# Patient Record
Sex: MALE | Race: WHITE | NOT HISPANIC OR LATINO | Employment: UNEMPLOYED | URBAN - METROPOLITAN AREA
[De-identification: names, ages, dates, MRNs, and addresses within clinical notes are randomized per-mention and may not be internally consistent; named-entity substitution may affect disease eponyms.]

---

## 2017-04-28 ENCOUNTER — OFFICE VISIT (OUTPATIENT)
Dept: MEDICAL GROUP | Facility: PHYSICIAN GROUP | Age: 47
End: 2017-04-28
Payer: COMMERCIAL

## 2017-04-28 VITALS
DIASTOLIC BLOOD PRESSURE: 102 MMHG | OXYGEN SATURATION: 97 % | HEART RATE: 93 BPM | SYSTOLIC BLOOD PRESSURE: 152 MMHG | BODY MASS INDEX: 23.48 KG/M2 | HEIGHT: 70 IN | WEIGHT: 164 LBS | TEMPERATURE: 98.1 F | RESPIRATION RATE: 16 BRPM

## 2017-04-28 DIAGNOSIS — F17.200 TOBACCO DEPENDENCE: ICD-10-CM

## 2017-04-28 DIAGNOSIS — F41.9 ANXIETY: ICD-10-CM

## 2017-04-28 DIAGNOSIS — F10.10 ALCOHOL ABUSE, DAILY USE: ICD-10-CM

## 2017-04-28 DIAGNOSIS — I10 ESSENTIAL HYPERTENSION: ICD-10-CM

## 2017-04-28 PROCEDURE — 99214 OFFICE O/P EST MOD 30 MIN: CPT | Performed by: NURSE PRACTITIONER

## 2017-04-28 RX ORDER — LORAZEPAM 0.5 MG/1
0.5 TABLET ORAL DAILY
Qty: 30 TAB | Refills: 2 | Status: SHIPPED | OUTPATIENT
Start: 2017-04-28 | End: 2017-07-27 | Stop reason: SDUPTHER

## 2017-04-28 RX ORDER — ENALAPRIL MALEATE 5 MG/1
5 TABLET ORAL DAILY
Qty: 90 TAB | Refills: 1 | Status: SHIPPED | OUTPATIENT
Start: 2017-04-28 | End: 2017-09-30

## 2017-04-28 RX ORDER — ENALAPRIL MALEATE 5 MG/1
5 TABLET ORAL DAILY
COMMUNITY
End: 2017-09-30

## 2017-04-28 RX ORDER — LORAZEPAM 0.5 MG/1
0.5 TABLET ORAL DAILY
COMMUNITY
End: 2017-04-28 | Stop reason: SDUPTHER

## 2017-04-28 ASSESSMENT — PATIENT HEALTH QUESTIONNAIRE - PHQ9: CLINICAL INTERPRETATION OF PHQ2 SCORE: 0

## 2017-04-28 NOTE — MR AVS SNAPSHOT
"        Chiki King   2017 8:20 AM   Office Visit   MRN: [id number]    Department:  [facility]   Dept Phone:  [phone]    Description:  Male : 1970   Provider:  NIK Rivera           Reason for Visit     Establish Care           Allergies as of 2017     No Known Allergies      You were diagnosed with     Anxiety   [238481]       Essential hypertension   [9182998]       Alcohol abuse, daily use   [585364]       Tobacco dependence   [676983]         Vital Signs     Blood Pressure Pulse Temperature Respirations Height Weight    152/102 mmHg 93 36.7 °C (98.1 °F) 16 1.778 m (5' 10\") 74.39 kg (164 lb)    Body Mass Index Oxygen Saturation Smoking Status             23.53 kg/m2 97% Current Every Day Smoker         Basic Information     Date Of Birth Sex Race Ethnicity Preferred Language    1970 Male White Non- English      Problem List              ICD-10-CM Priority Class Noted - Resolved    Anxiety F41.9   2017 - Present    Essential hypertension I10   2017 - Present    Alcohol abuse, daily use F10.10   2017 - Present    Tobacco dependence F17.200   2017 - Present      Health Maintenance        Date Due Completion Dates    IMM DTaP/Tdap/Td Vaccine (1 - Tdap) 1988 ---            Current Immunizations     No immunizations on file.      Below and/or attached are the medications your provider expects you to take. Review all of your home medications and newly ordered medications with your provider and/or pharmacist. Follow medication instructions as directed by your provider and/or pharmacist. Please keep your medication list with you and share with your provider. Update the information when medications are discontinued, doses are changed, or new medications (including over-the-counter products) are added; and carry medication information at all times in the event of emergency situations     Allergies:  No Known Allergies          "   Medications  Valid as of: April 28, 2017 - 10:12 AM    Generic Name Brand Name Tablet Size Instructions for use    Enalapril Maleate (Tab) VASOTEC 5 MG Take 5 mg by mouth every day.        Enalapril Maleate (Tab) VASOTEC 5 MG Take 1 Tab by mouth every day.        LORazepam (Tab) ATIVAN 0.5 MG Take 1 Tab by mouth every day.        .                 Medicines prescribed today were sent to:     [facility] [id number] [place] [address], [place] - [address] [facility] AT [address] [place] [address]    Phone: [phone] Fax: [phone]    Open 24 Hours?: No      Medication refill instructions:       If your prescription bottle indicates you have medication refills left, it is not necessary to call your provider’s office. Please contact your pharmacy and they will refill your medication.    If your prescription bottle indicates you do not have any refills left, you may request refills at any time through one of the following ways: The online [employer] system (except Urgent Care), by calling your provider’s office, or by asking your pharmacy to contact your provider’s office with a refill request. Medication refills are processed only during regular business hours and may not be available until the next business day. Your provider may request additional information or to have a follow-up visit with you prior to refilling your medication.   *Please Note: Medication refills are assigned a new Rx number when refilled electronically. Your pharmacy may indicate that no refills were authorized even though a new prescription for the same medication is available at the pharmacy. Please request the medicine by name with the pharmacy before contacting your provider for a refill.        Instructions    Managing Your High Blood Pressure  Blood pressure is a measurement of how forceful your blood is pressing against the walls of the arteries. Arteries are muscular tubes within the circulatory system.  Blood pressure does not stay the same. Blood pressure rises when you are active, excited, or nervous; and it lowers during sleep and relaxation. If the numbers measuring your blood pressure stay above normal most of the time, you are at risk for health problems. High blood pressure (hypertension) is a long-term (chronic) condition in which blood pressure is elevated.  A blood pressure reading is recorded as two numbers, such as 120 over 80 (or 120/80). The first, higher number is called the systolic pressure. It is a measure of the pressure in your arteries as the heart beats. The second, lower number is called the diastolic pressure. It is a measure of the pressure in your arteries as the heart relaxes between beats.   Keeping your blood pressure in a normal range is important to your overall health and prevention of health problems, such as heart disease and stroke. When your blood pressure is uncontrolled, your heart has to work harder than normal. High blood pressure is a very common condition in adults because blood pressure tends to rise with age. Men and women are equally likely to have hypertension but at different times in life. Before age 45, men are more likely to have hypertension. After 65 years of age, women are more likely to have it. Hypertension is especially common in  Americans. This condition often has no signs or symptoms. The cause of the condition is usually not known. Your caregiver can help you come up with a plan to keep your blood pressure in a normal, healthy range.  BLOOD PRESSURE STAGES  Blood pressure is classified into four stages: normal, prehypertension, stage 1, and stage 2. Your blood pressure reading will be used to determine what type of treatment, if any, is necessary. Appropriate treatment options are tied to these four stages:   Normal  · Systolic pressure (mm Hg): below 120.  · Diastolic pressure (mm Hg): below 80.  Prehypertension  · Systolic pressure (mm Hg): 120 to  139.  · Diastolic pressure (mm Hg): 80 to 89.  Stage 1  · Systolic pressure (mm Hg): 140 to 159.  · Diastolic pressure (mm Hg): 90 to 99.  Stage 2  · Systolic pressure (mm Hg): 160 or above.  · Diastolic pressure (mm Hg): 100 or above.  RISKS RELATED TO HIGH BLOOD PRESSURE  Managing your blood pressure is an important responsibility. Uncontrolled high blood pressure can lead to:  · A heart attack.  · A stroke.  · A weakened blood vessel (aneurysm).  · Heart failure.  · Kidney damage.  · Eye damage.  · Metabolic syndrome.  · Memory and concentration problems.  HOW TO MANAGE YOUR BLOOD PRESSURE  Blood pressure can be managed effectively with lifestyle changes and medicines (if needed). Your caregiver will help you come up with a plan to bring your blood pressure within a normal range. Your plan should include the following:  Education  · Read all information provided by your caregivers about how to control blood pressure.  · Educate yourself on the latest guidelines and treatment recommendations. New research is always being done to further define the risks and treatments for high blood pressure.  Lifestyle changes  · Control your weight.  · Avoid smoking.  · Stay physically active.  · Reduce the amount of salt in your diet.  · Reduce stress.  · Control any chronic conditions, such as high cholesterol or diabetes.  · Reduce your alcohol intake.  Medicines  · Several medicines (antihypertensive medicines) are available, if needed, to bring blood pressure within a normal range.  Communication  · Review all the medicines you take with your caregiver because there may be side effects or interactions.  · Talk with your caregiver about your diet, exercise habits, and other lifestyle factors that may be contributing to high blood pressure.  · See your caregiver regularly. Your caregiver can help you create and adjust your plan for managing high blood pressure.  RECOMMENDATIONS FOR TREATMENT AND FOLLOW-UP   The following  recommendations are based on current guidelines for managing high blood pressure in nonpregnant adults. Use these recommendations to identify the proper follow-up period or treatment option based on your blood pressure reading. You can discuss these options with your caregiver.  · Systolic pressure of 120 to 139 or diastolic pressure of 80 to 89: Follow up with your caregiver as directed.  · Systolic pressure of 140 to 160 or diastolic pressure of 90 to 100: Follow up with your caregiver within 2 months.  · Systolic pressure above 160 or diastolic pressure above 100: Follow up with your caregiver within 1 month.  · Systolic pressure above 180 or diastolic pressure above 110: Consider antihypertensive therapy; follow up with your caregiver within 1 week.  · Systolic pressure above 200 or diastolic pressure above 120: Begin antihypertensive therapy; follow up with your caregiver within 1 week.     This information is not intended to replace advice given to you by your health care provider. Make sure you discuss any questions you have with your health care provider.     Document Released: 09/11/2013 Document Reviewed: 09/11/2013  [employer] Interactive Patient Education ©2016 Elsevier Inc.              food.de Access Code: [email or web address]  Expires: 5/10/2017  1:04 PM    food.de  A secure, online tool to manage your health information     [employer]’s food.de® is a secure, online tool that connects you to your personalized health information from the privacy of your home -- day or night - making it very easy for you to manage your healthcare. Once the activation process is completed, you can even access your medical information using the food.de kathy, which is available for free in the Apple Kathy store or Google Play store.     food.de provides the following levels of access (as shown below):   My Chart Features   [facility] Doctor [facility]  Specialists [facility]  Urgent  Care Non-[facility]  Primary Care  Doctor    Email your healthcare team securely and privately 24/7 X X X    Manage appointments: schedule your next appointment; view details of past/upcoming appointments X      Request prescription refills. X      View recent personal medical records, including lab and immunizations X X X X   View health record, including health history, allergies, medications X X X X   Read reports about your outpatient visits, procedures, consult and ER notes X X X X   See your discharge summary, which is a recap of your hospital and/or ER visit that includes your diagnosis, lab results, and care plan. X X       How to register for [employer]:  1. Go to  [email or web address].  2. Click on the Sign Up Now box, which takes you to the New Member Sign Up page. You will need to provide the following information:  a. Enter your [employer] Access Code exactly as it appears at the top of this page. (You will not need to use this code after you’ve completed the sign-up process. If you do not sign up before the expiration date, you must request a new code.)   b. Enter your date of birth.   c. Enter your home email address.   d. Click Submit, and follow the next screen’s instructions.  3. Create a [employer] ID. This will be your [employer] login ID and cannot be changed, so think of one that is secure and easy to remember.  4. Create a [employer] password. You can change your password at any time.  5. Enter your Password Reset Question and Answer. This can be used at a later time if you forget your password.   6. Enter your e-mail address. This allows you to receive e-mail notifications when new information is available in [employer].  7. Click Sign Up. You can now view your health information.    For assistance activating your [employer] account, call [phone]        Quit Tobacco Information     Do you want to quit using tobacco?    Quitting tobacco decreases risks of cancer, heart and lung disease, increases life expectancy, improves sense of taste and  smell, and increases spending money, among other benefits.    If you are thinking about quitting, we can help.  • [facility] Quit Tobacco Program: 620.127.5082  o Program occurs weekly for four weeks and includes pharmacist consultation on products to support quitting smoking or chewing tobacco. A provider referral is needed for pharmacist consultation.  • Tobacco Users Help Hotline: 5-800-QUIT-NOW (395-6654) or https://[place].quitlogix.org/  o Free, confidential telephone and online coaching for [place] residents. Sessions are designed on a schedule that is convenient for you. Eligible clients receive free nicotine replacement therapy.  • Nationally: www.smokefree.gov  o Information and professional assistance to support both immediate and long-term needs as you become, and remain, a non-smoker. Smokefree.gov allows you to choose the help that best fits your needs.

## 2017-04-28 NOTE — ASSESSMENT & PLAN NOTE
Chronic condition: Patient has been treated for hypertension for 8 years. He is currently not taking any medication. Was previously on enalapril 5 mg daily. He stopped medication 18 months ago after working out in the gym and lowering the blood pressure. Blood pressure today is 152/102 and on a previous visit December 30, 2016 was 156/92. Discussed restarting medication.  He denies any chest pain, diaphoresis, shortness of breath, headaches, dizziness or blurred vision. Mother and sister also  Have high blood pressure and father has heart disease and needs bypass surgery.

## 2017-04-28 NOTE — ASSESSMENT & PLAN NOTE
Chronic condition: Patient had anxiety for 1.5 years and currently takes lorazepam on an as-needed basis. 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

## 2017-04-28 NOTE — ASSESSMENT & PLAN NOTE
Patient is a current smoker and has been smoking at least a half a pack a day for 20 years. He had 2 years that he quit smoking using the vape cigarette. He is interested in quitting and we discussed Wellbutrin and Chantix. He is not ready to quit at this time due to his stress level with his father's health concerns. He will contact me when he is ready to prescribe Wellbutrin

## 2017-04-28 NOTE — Clinical Note
[facility]  NIK Rivera  [address] [facility] [address]  [place] [address]  Fax: [phone]   Authorization for Release/Disclosure of   Protected Health Information   Name: LUIS MIGUEL SHEA : 1970 SSN: [id number]   Address: [address] [place], [place] [address]   [place] [address] Phone:    [phone] (home)    I authorize the entity listed below to release/disclose the PHI below to:   [facility]/NIK Rivera and NIK Rivera   Provider or Entity Name: [facility]   Dr Mistry      [facility]   Phone:      Fax:[phone]     Reason for request: continuity of care   Information to be released:    [  ] LAST COLONOSCOPY,  including any PATH REPORT and follow-up  [  ] LAST FIT/COLOGUARD RESULT [  ] LAST DEXA  [  ] LAST MAMMOGRAM  [  ] LAST PAP  [  ] LAST LABS [  ] RETINA EXAM REPORT  [  ] IMMUNIZATION RECORDS  [*] Release all info      [  ] Check here and initial the line next to each item to release ALL health information INCLUDING  _____ Care and treatment for drug and / or alcohol abuse  _____ HIV testing, infection status, or AIDS  _____ Genetic Testing    DATES OF SERVICE OR TIME PERIOD TO BE DISCLOSED: ___All Records____  I understand and acknowledge that:  * This Authorization may be revoked at any time by you in writing, except if your health information has already been used or disclosed.  * Your health information that will be used or disclosed as a result of you signing this authorization could be re-disclosed by the recipient. If this occurs, your re-disclosed health information may no longer be protected by State or Federal laws.  * You may refuse to sign this Authorization. Your refusal will not affect your ability to obtain treatment.  * This Authorization becomes effective upon signing and will  on (date) __________.      If no date is indicated, this Authorization will  one (1) year from the signature date.       Name: Chiki King    Signature:   Date:     4/28/2017       PLEASE FAX REQUESTED RECORDS BACK TO: [phone]

## 2017-04-28 NOTE — ASSESSMENT & PLAN NOTE
Patient admits to alcohol use drinking approximately 4 drinks daily 7 days a week. He states that sometimes it less depending how long it takes him to wind down from work. His father is ill at this time and causing him a lot of stress, but baking him realize he has to take care of his own health. He also had a paternal grandfather who  at a young age from alcoholic cirrhosis. He plans to start decreasing his alcohol intake

## 2017-04-28 NOTE — PROGRESS NOTES
Chief Complaint   Patient presents with   • Establish Care       HPI:    Chiki King is a 46 y.o. male here to establish care and to discuss the following:    Anxiety  Chronic condition: Patient had anxiety for 1.5 years and currently takes lorazepam on an as-needed basis. 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    Essential hypertension  Chronic condition: Patient has been treated for hypertension for 8 years. He is currently not taking any medication. Was previously on enalapril 5 mg daily. He stopped medication 18 months ago after working out in the gym and lowering the blood pressure. Blood pressure today is 152/102 and on a previous visit 2016 was 156/92. Discussed restarting medication.  He denies any chest pain, diaphoresis, shortness of breath, headaches, dizziness or blurred vision. Mother and sister also  Have high blood pressure and father has heart disease and needs bypass surgery.    Alcohol abuse, daily use  Patient admits to alcohol use drinking approximately 4 drinks daily 7 days a week. He states that sometimes it less depending how long it takes him to wind down from work. His father is ill at this time and causing him a lot of stress, but baking him realize he has to take care of his own health. He also had a paternal grandfather who  at a young age from alcoholic cirrhosis. He plans to start decreasing his alcohol intake    Tobacco dependence  Patient is a current smoker and has been smoking at least a half a pack a day for 20 years. He had 2 years that he quit smoking using the vape cigarette. He is interested in quitting and we discussed Wellbutrin and Chantix. He is not ready to quit at this time due to his stress level with his father's health concerns. He will contact me when he is ready to prescribe Wellbutrin        Current medicines (including changes today)  Current Outpatient Prescriptions   Medication  Sig Dispense Refill   • enalapril (VASOTEC) 5 MG Tab Take 5 mg by mouth every day.     • enalapril (VASOTEC) 5 MG Tab Take 1 Tab by mouth every day. 90 Tab 1   • lorazepam (ATIVAN) 0.5 MG Tab Take 1 Tab by mouth every day. 30 Tab 2     No current facility-administered medications for this visit.     He  has a past medical history of Essential hypertension; Anxiety; and Trigeminal neuralgia.  He  has past surgical history that includes rhinoplasty.  Social History   Substance Use Topics   • Smoking status: Current Every Day Smoker -- 0.50 packs/day for 20 years   • Smokeless tobacco: Never Used   • Alcohol Use: 16.8 oz/week     28 Standard drinks or equivalent per week     Social History     Social History Narrative     Family History   Problem Relation Age of Onset   • Hypertension Mother    • Heart Disease Father    • Hypertension Father    • Cancer Father      leukemia   • Hypertension Sister    • Thyroid Sister    • Cancer Maternal Grandfather      bone   • Diabetes Paternal Grandmother    • Alcohol/Drug Paternal Grandfather      Family Status   Relation Status Death Age   • Mother Alive    • Father Alive    • Sister Alive    • Maternal Grandmother Alive    • Maternal Grandfather     • Paternal Grandmother     • Paternal Grandfather       alcoholic cirrhosis         ROS    Review of Systems   Constitutional: Negative for fever, chills, weight loss and malaise/fatigue.   HENT: Negative for ear pain, nosebleeds, congestion, sore throat and neck pain.    Eyes: Negative for blurred vision.   Respiratory: Negative for cough, sputum production, shortness of breath and wheezing.    Cardiovascular: Negative for chest pain, palpitations,  and leg swelling.   Gastrointestinal: Negative for heartburn, nausea, vomiting, diarrhea and abdominal pain.   Genitourinary: Negative for dysuria, urgency and frequency.   Musculoskeletal: Negative for myalgias, back pain and joint pain.   Skin: Negative for rash  "and itching.   Neurological: Negative for dizziness, tingling, tremors, sensory change, focal weakness and headaches.   Endo/Heme/Allergies: Does not bruise/bleed easily.   Psychiatric/Behavioral: Negative for depression,  suicidal ideas, insomnia and memory loss.  Positive for anxiety    All other systems reviewed and are negative except as in HPI.    Immunizations: Cannot get a flu vaccine this year, but had one last year.               Objective:     Blood pressure 152/102, pulse 93, temperature 36.7 °C (98.1 °F), resp. rate 16, height 1.778 m (5' 10\"), weight 74.39 kg (164 lb), SpO2 97 %. Body mass index is 23.53 kg/(m^2).  Physical Exam:  Constitutional: Alert, no distress.  Skin: Warm, dry, good turgor, no rashes in visible areas.  Eye: Equal, round and reactive, conjunctiva clear, lids normal.  ENMT: Lips without lesions, good dentition, oropharynx clear. Ear canals are clear, TMs within normal limits bilaterally.   Neck: Trachea midline, no masses, no thyromegaly. No cervical or supraclavicular lymphadenopathy.  Respiratory: Unlabored respiratory effort, lungs clear to auscultation, no wheezes, no ronchi.  Cardiovascular: Normal S1, S2, no murmur, no edema.  Abdomen: Soft, non-tender, no masses, no hepatosplenomegaly.  Psych: Alert and oriented x3, normal affect and mood.  MS: Ambulates independently with steady gait. Has full range of motion of all extremities and spine.  Neuro: Cranial nerves intact. DTRs within normal limits. No neurological deficits.        Assessment and Plan:   The following treatment plan was discussed   1. Anxiety  lorazepam (ATIVAN) 0.5 MG Tab   2. Essential hypertension  enalapril (VASOTEC) 5 MG Tab   3. Alcohol abuse, daily use     4. Tobacco dependence       Followup: Return in about 3 months (around 7/28/2017) for anxiety, bp.   Please note that this dictation was created using voice recognition software. I have made every reasonable attempt to correct obvious errors, but I " expect that there are errors of grammar and possibly content that I did not discover before finalizing the note.

## 2017-04-28 NOTE — PATIENT INSTRUCTIONS
Managing Your High Blood Pressure  Blood pressure is a measurement of how forceful your blood is pressing against the walls of the arteries. Arteries are muscular tubes within the circulatory system. Blood pressure does not stay the same. Blood pressure rises when you are active, excited, or nervous; and it lowers during sleep and relaxation. If the numbers measuring your blood pressure stay above normal most of the time, you are at risk for health problems. High blood pressure (hypertension) is a long-term (chronic) condition in which blood pressure is elevated.  A blood pressure reading is recorded as two numbers, such as 120 over 80 (or 120/80). The first, higher number is called the systolic pressure. It is a measure of the pressure in your arteries as the heart beats. The second, lower number is called the diastolic pressure. It is a measure of the pressure in your arteries as the heart relaxes between beats.   Keeping your blood pressure in a normal range is important to your overall health and prevention of health problems, such as heart disease and stroke. When your blood pressure is uncontrolled, your heart has to work harder than normal. High blood pressure is a very common condition in adults because blood pressure tends to rise with age. Men and women are equally likely to have hypertension but at different times in life. Before age 45, men are more likely to have hypertension. After 65 years of age, women are more likely to have it. Hypertension is especially common in  Americans. This condition often has no signs or symptoms. The cause of the condition is usually not known. Your caregiver can help you come up with a plan to keep your blood pressure in a normal, healthy range.  BLOOD PRESSURE STAGES  Blood pressure is classified into four stages: normal, prehypertension, stage 1, and stage 2. Your blood pressure reading will be used to determine what type of treatment, if any, is necessary.  Appropriate treatment options are tied to these four stages:   Normal  · Systolic pressure (mm Hg): below 120.  · Diastolic pressure (mm Hg): below 80.  Prehypertension  · Systolic pressure (mm Hg): 120 to 139.  · Diastolic pressure (mm Hg): 80 to 89.  Stage 1  · Systolic pressure (mm Hg): 140 to 159.  · Diastolic pressure (mm Hg): 90 to 99.  Stage 2  · Systolic pressure (mm Hg): 160 or above.  · Diastolic pressure (mm Hg): 100 or above.  RISKS RELATED TO HIGH BLOOD PRESSURE  Managing your blood pressure is an important responsibility. Uncontrolled high blood pressure can lead to:  · A heart attack.  · A stroke.  · A weakened blood vessel (aneurysm).  · Heart failure.  · Kidney damage.  · Eye damage.  · Metabolic syndrome.  · Memory and concentration problems.  HOW TO MANAGE YOUR BLOOD PRESSURE  Blood pressure can be managed effectively with lifestyle changes and medicines (if needed). Your caregiver will help you come up with a plan to bring your blood pressure within a normal range. Your plan should include the following:  Education  · Read all information provided by your caregivers about how to control blood pressure.  · Educate yourself on the latest guidelines and treatment recommendations. New research is always being done to further define the risks and treatments for high blood pressure.  Lifestyle changes  · Control your weight.  · Avoid smoking.  · Stay physically active.  · Reduce the amount of salt in your diet.  · Reduce stress.  · Control any chronic conditions, such as high cholesterol or diabetes.  · Reduce your alcohol intake.  Medicines  · Several medicines (antihypertensive medicines) are available, if needed, to bring blood pressure within a normal range.  Communication  · Review all the medicines you take with your caregiver because there may be side effects or interactions.  · Talk with your caregiver about your diet, exercise habits, and other lifestyle factors that may be contributing to  high blood pressure.  · See your caregiver regularly. Your caregiver can help you create and adjust your plan for managing high blood pressure.  RECOMMENDATIONS FOR TREATMENT AND FOLLOW-UP   The following recommendations are based on current guidelines for managing high blood pressure in nonpregnant adults. Use these recommendations to identify the proper follow-up period or treatment option based on your blood pressure reading. You can discuss these options with your caregiver.  · Systolic pressure of 120 to 139 or diastolic pressure of 80 to 89: Follow up with your caregiver as directed.  · Systolic pressure of 140 to 160 or diastolic pressure of 90 to 100: Follow up with your caregiver within 2 months.  · Systolic pressure above 160 or diastolic pressure above 100: Follow up with your caregiver within 1 month.  · Systolic pressure above 180 or diastolic pressure above 110: Consider antihypertensive therapy; follow up with your caregiver within 1 week.  · Systolic pressure above 200 or diastolic pressure above 120: Begin antihypertensive therapy; follow up with your caregiver within 1 week.     This information is not intended to replace advice given to you by your health care provider. Make sure you discuss any questions you have with your health care provider.     Document Released: 09/11/2013 Document Reviewed: 09/11/2013  [employer] Interactive Patient Education ©2016 [employer]

## 2017-07-27 ENCOUNTER — OFFICE VISIT (OUTPATIENT)
Dept: MEDICAL GROUP | Facility: PHYSICIAN GROUP | Age: 47
End: 2017-07-27
Payer: COMMERCIAL

## 2017-07-27 VITALS
BODY MASS INDEX: 23.54 KG/M2 | TEMPERATURE: 97.7 F | HEIGHT: 70 IN | HEART RATE: 75 BPM | OXYGEN SATURATION: 97 % | WEIGHT: 164.4 LBS | RESPIRATION RATE: 16 BRPM | DIASTOLIC BLOOD PRESSURE: 100 MMHG | SYSTOLIC BLOOD PRESSURE: 120 MMHG

## 2017-07-27 DIAGNOSIS — F41.9 ANXIETY: ICD-10-CM

## 2017-07-27 DIAGNOSIS — I10 ESSENTIAL HYPERTENSION: ICD-10-CM

## 2017-07-27 PROCEDURE — 99214 OFFICE O/P EST MOD 30 MIN: CPT | Performed by: NURSE PRACTITIONER

## 2017-07-27 RX ORDER — LORAZEPAM 0.5 MG/1
0.5 TABLET ORAL DAILY
Qty: 30 TAB | Refills: 2 | Status: SHIPPED | OUTPATIENT
Start: 2017-07-27 | End: 2017-12-22 | Stop reason: SDUPTHER

## 2017-07-27 RX ORDER — ENALAPRIL MALEATE AND HYDROCHLOROTHIAZIDE 5; 12.5 MG/1; MG/1
1 TABLET ORAL
Qty: 30 TAB | Refills: 1 | Status: SHIPPED | OUTPATIENT
Start: 2017-07-27 | End: 2017-09-29 | Stop reason: SDUPTHER

## 2017-07-27 NOTE — ASSESSMENT & PLAN NOTE
Chronic condition: Patient is treated for hypertension with enalapril.  He denies any chest pain, diaphoresis, shortness of breath, headaches, dizziness or blurred vision. BP today is 120/100.

## 2017-07-27 NOTE — PROGRESS NOTES
Subjective:     Chief Complaint   Patient presents with   • Follow-Up     Medications        HPI:  Chiki King is a 47 y.o. male here today to discuss the following:    Essential hypertension  Chronic condition: Patient is treated for hypertension with enalapril.  He denies any chest pain, diaphoresis, shortness of breath, headaches, dizziness or blurred vision. BP today is 120/100.    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 He is requesting refills on medication. He denies any chest pain or tightness, shortness of breath or dyspnea, headaches or dizziness..           Current medicines (including changes today)  Current Outpatient Prescriptions   Medication Sig Dispense Refill   • ENALAPRIL MALEATE-HCTZ 5-12.5 MG Tab Take 1 tablet by mouth 1 time daily as needed. 30 Tab 1   • lorazepam (ATIVAN) 0.5 MG Tab Take 1 Tab by mouth every day. 30 Tab 2   • enalapril (VASOTEC) 5 MG Tab Take 5 mg by mouth every day.     • enalapril (VASOTEC) 5 MG Tab Take 1 Tab by mouth every day. 90 Tab 1     No current facility-administered medications for this visit.       He  has a past medical history of Essential hypertension; Anxiety; and Trigeminal neuralgia.    ROS   Review of Systems   Constitutional: Negative for fever, chills, weight loss and malaise/fatigue.   HENT: Negative for ear pain, nosebleeds, congestion, sore throat and neck pain.    Respiratory: Negative for cough, sputum production, shortness of breath and wheezing.    Cardiovascular: Negative for chest pain, palpitations,  and leg swelling.   Gastrointestinal: Negative for heartburn, nausea, vomiting, diarrhea and abdominal pain.   Neurological: Negative for dizziness, tingling, tremors, sensory change, focal weakness and headaches.   Psychiatric/Behavioral: Negative for depression, suicidal ideas, insomnia and  "memory loss.  Positive for anxiety  All other systems reviewed and are negative except as in HPI.     Objective:   Physical Exam:  Blood pressure 120/100, pulse 75, temperature 36.5 °C (97.7 °F), resp. rate 16, height 1.778 m (5' 10\"), weight 74.571 kg (164 lb 6.4 oz), SpO2 97 %. Body mass index is 23.59 kg/(m^2).   Physical Exam:  Alert, oriented in no acute distress.  Eye contact is good, speech goal directed, affect calm  HEENT: conjunctiva non-injected, sclera non-icteric.  Pinna normal.   Neck No adenopathy or masses in the neck or supraclavicular regions.  Lungs: clear to auscultation bilaterally with good excursion.  CV: regular rate and rhythm.   Abdomen: soft, nontender, No CVAT. Normal bowel sounds.  Ext: no edema, color normal, vascularity normal, temperature normal  MS: Normal gait and station    Assessment and Plan:   The following treatment plan was discussed   1. Essential hypertension  ENALAPRIL MALEATE-HCTZ 5-12.5 MG Tab   2. Anxiety  lorazepam (ATIVAN) 0.5 MG Tab    added HCTZ 12.5 mg daily to enalapril    Followup: Return in about 3 months (around 10/27/2017) for anxiety, hypertension.   Please note that this dictation was created using voice recognition software. I have made every reasonable attempt to correct obvious errors, but I expect that there are errors of grammar and possibly content that I did not discover before finalizing the note.             "

## 2017-08-07 NOTE — ASSESSMENT & PLAN NOTE
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 He is requesting refills on medication. He denies any chest pain or tightness, shortness of breath or dyspnea, headaches or dizziness..

## 2017-09-29 DIAGNOSIS — I10 ESSENTIAL HYPERTENSION: ICD-10-CM

## 2017-09-30 RX ORDER — ENALAPRIL MALEATE AND HYDROCHLOROTHIAZIDE 5; 12.5 MG/1; MG/1
TABLET ORAL
Qty: 90 TAB | Refills: 1 | Status: SHIPPED | OUTPATIENT
Start: 2017-09-30 | End: 2017-12-22 | Stop reason: SDUPTHER

## 2017-12-22 ENCOUNTER — OFFICE VISIT (OUTPATIENT)
Dept: MEDICAL GROUP | Facility: PHYSICIAN GROUP | Age: 47
End: 2017-12-22
Payer: COMMERCIAL

## 2017-12-22 VITALS
HEART RATE: 98 BPM | WEIGHT: 167 LBS | RESPIRATION RATE: 12 BRPM | TEMPERATURE: 98.8 F | SYSTOLIC BLOOD PRESSURE: 124 MMHG | DIASTOLIC BLOOD PRESSURE: 90 MMHG | BODY MASS INDEX: 23.96 KG/M2 | OXYGEN SATURATION: 95 %

## 2017-12-22 DIAGNOSIS — F10.10 ALCOHOL ABUSE, DAILY USE: ICD-10-CM

## 2017-12-22 DIAGNOSIS — Z13.6 SCREENING FOR CARDIOVASCULAR, RESPIRATORY, AND GENITOURINARY DISEASES: ICD-10-CM

## 2017-12-22 DIAGNOSIS — Z12.83 SKIN CANCER SCREENING: ICD-10-CM

## 2017-12-22 DIAGNOSIS — I10 ESSENTIAL HYPERTENSION: Chronic | ICD-10-CM

## 2017-12-22 DIAGNOSIS — F17.200 TOBACCO DEPENDENCE: Chronic | ICD-10-CM

## 2017-12-22 DIAGNOSIS — Z13.83 SCREENING FOR CARDIOVASCULAR, RESPIRATORY, AND GENITOURINARY DISEASES: ICD-10-CM

## 2017-12-22 DIAGNOSIS — F41.9 ANXIETY: Chronic | ICD-10-CM

## 2017-12-22 DIAGNOSIS — Z23 NEED FOR VACCINATION: ICD-10-CM

## 2017-12-22 DIAGNOSIS — B00.1 HERPES LABIALIS: ICD-10-CM

## 2017-12-22 DIAGNOSIS — Z13.89 SCREENING FOR CARDIOVASCULAR, RESPIRATORY, AND GENITOURINARY DISEASES: ICD-10-CM

## 2017-12-22 PROCEDURE — 90471 IMMUNIZATION ADMIN: CPT | Performed by: NURSE PRACTITIONER

## 2017-12-22 PROCEDURE — 90686 IIV4 VACC NO PRSV 0.5 ML IM: CPT | Performed by: NURSE PRACTITIONER

## 2017-12-22 PROCEDURE — 99214 OFFICE O/P EST MOD 30 MIN: CPT | Mod: 25 | Performed by: NURSE PRACTITIONER

## 2017-12-22 RX ORDER — LORAZEPAM 0.5 MG/1
0.5 TABLET ORAL DAILY
Qty: 30 TAB | Refills: 2 | Status: SHIPPED | OUTPATIENT
Start: 2017-12-22 | End: 2018-05-22 | Stop reason: SDUPTHER

## 2017-12-22 RX ORDER — ENALAPRIL MALEATE AND HYDROCHLOROTHIAZIDE 5; 12.5 MG/1; MG/1
1 TABLET ORAL
Qty: 90 TAB | Refills: 1 | Status: SHIPPED | OUTPATIENT
Start: 2017-12-22 | End: 2018-04-23 | Stop reason: SDUPTHER

## 2017-12-22 RX ORDER — VALACYCLOVIR HYDROCHLORIDE 500 MG/1
2000 TABLET, FILM COATED ORAL EVERY 12 HOURS PRN
Qty: 240 TAB | Refills: 1 | Status: SHIPPED | OUTPATIENT
Start: 2017-12-22 | End: 2020-09-04 | Stop reason: SDUPTHER

## 2017-12-22 RX ORDER — VALACYCLOVIR HYDROCHLORIDE 500 MG/1
500 TABLET, FILM COATED ORAL 2 TIMES DAILY
COMMUNITY
End: 2017-12-22 | Stop reason: SDUPTHER

## 2017-12-22 NOTE — ASSESSMENT & PLAN NOTE
Chronic condition: Patient's had anxiety for about 5 years. 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 We have discussed the dangers of alcohol with lorazepam, but he states that he takes the medication early in the day

## 2017-12-22 NOTE — ASSESSMENT & PLAN NOTE
Chronic condition: Patient treated for hypertension with enalapril. Blood pressure today is 140/90.. He denies any chest pain, diaphoresis, shortness of breath, headaches, dizziness or blurred vision.

## 2017-12-22 NOTE — ASSESSMENT & PLAN NOTE
Patient gets cold sores approximately once a month and takes acyclovir. He is requesting a refill on prescription.

## 2017-12-22 NOTE — ASSESSMENT & PLAN NOTE
Patient is a current smoker only smoking 3 cigarettes daily. He is here today to discuss smoking cessation options. He elected to try nicotine patches as he has already decreased significantly on his own.

## 2017-12-22 NOTE — ASSESSMENT & PLAN NOTE
Patient reports he is currently drinking 3 alcoholic drinks daily, which is a reduction from previous intake. He has a cold 2 stop drinking.

## 2017-12-22 NOTE — PROGRESS NOTES
Subjective:     Chief Complaint   Patient presents with   • Follow-Up     Fill scripts and wants to quit smoking       HPI:  Chiki King is a 47 y.o. male here today to discuss the following:    Alcohol abuse, daily use  Patient reports he is currently drinking 3 alcoholic drinks daily, which is a reduction from previous intake. He has a cold 2 stop drinking.    Anxiety  Chronic condition: Patient's had anxiety for about 5 years. 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 We have discussed the dangers of alcohol with lorazepam, but he states that he takes the medication early in the day    Essential hypertension  Chronic condition: Patient treated for hypertension with enalapril. Blood pressure today is 140/90.. He denies any chest pain, diaphoresis, shortness of breath, headaches, dizziness or blurred vision.    Herpes labialis  Patient gets cold sores approximately once a month and takes acyclovir. He is requesting a refill on prescription.    Skin cancer screening  Patient requesting referral to dermatology for full body skin cancer screening.    Tobacco dependence  Patient is a current smoker only smoking 3 cigarettes daily. He is here today to discuss smoking cessation options. He elected to try nicotine patches as he has already decreased significantly on his own.         Current medicines (including changes today)  Current Outpatient Prescriptions   Medication Sig Dispense Refill   • nicotine (NICODERM) 7 MG/24HR PATCH 24 HR Apply 1 Patch to skin as directed every 24 hours. 30 Patch 2   • valacyclovir (VALTREX) 500 MG Tab Take 4 Tabs by mouth every 12 hours as needed (for one day). 240 Tab 1   • lorazepam (ATIVAN) 0.5 MG Tab Take 1 Tab by mouth every day for 90 days. 30 Tab 2   • ENALAPRIL MALEATE-HCTZ 5-12.5 MG Tab Take 1 Tab by mouth 1 time daily as needed. 90 Tab 1     No current facility-administered medications for this visit.        He  has a past  medical history of Anxiety; Essential hypertension; and Trigeminal neuralgia.    ROS   Review of Systems   Constitutional: Negative for fever, chills, weight loss and malaise/fatigue.   HENT: Negative for ear pain, nosebleeds, congestion, sore throat and neck pain.    Respiratory: Negative for cough, sputum production, shortness of breath and wheezing.    Cardiovascular: Negative for chest pain, palpitations,  and leg swelling.   Gastrointestinal: Negative for heartburn, nausea, vomiting, diarrhea and abdominal pain.   Neurological: Negative for dizziness, tingling, tremors, sensory change, focal weakness and headaches.   Psychiatric/Behavioral: Negative for depression,  suicidal ideas, insomnia and memory loss.positive for anxiety    All other systems reviewed and are negative except as in HPI.   Objective:   Physical Exam:  Blood pressure 124/90, pulse 98, temperature 37.1 °C (98.8 °F), resp. rate 12, weight 75.8 kg (167 lb), SpO2 95 %. Body mass index is 23.96 kg/m².   Physical Exam:  Alert, oriented in no acute distress.  Eye contact is good, speech goal directed, affect calm  HEENT: conjunctiva non-injected, sclera non-icteric.  Pinna normal.   Neck No adenopathy or masses in the neck or supraclavicular regions.  Lungs: clear to auscultation bilaterally with good excursion.  CV: regular rate and rhythm.   Abdomen: soft, nontender, No CVAT. Normal bowel sounds.  Ext: no edema, color normal, vascularity normal, temperature normal  MS: Normal gait and station    Health Maintenance Due   Topic Date Due   • IMM DTaP/Tdap/Td Vaccine (1 - Tdap) 06/06/1988   • IMM PNEUMOCOCCAL 19-64 (ADULT) MEDIUM RISK SERIES (1 of 1 - PPSV23) 06/06/1988   • IMM INFLUENZA (1) 09/01/2017     Assessment and Plan:   The following treatment plan was discussed   1. Need for vaccination  INFLUENZA VACCINE QUAD INJ >3Y(PF)    Influenza vaccine given today   2. Skin cancer screening  REFERRAL TO DERMATOLOGY    Dermatology referral as requested    3. Anxiety  lorazepam (ATIVAN) 0.5 MG Tab    Discussed mindfulness therapy and decreasing lorazepam   4. Essential hypertension  ENALAPRIL MALEATE-HCTZ 5-12.5 MG Tab    Continue current medication   5. Herpes labialis  valacyclovir (VALTREX) 500 MG Tab    Valacyclovir 2000 mg every 12 hours for one day   6. Tobacco dependence  nicotine (NICODERM) 7 MG/24HR PATCH 24 HR    Nicotine patches   7. Screening for cardiovascular, respiratory, and genitourinary diseases  COMP METABOLIC PANEL    LIPID PROFILE    Labs ordered   8. Alcohol abuse, daily use      Discussed cessation   I have placed the above orders and discussed them with an approved delegating provider. The MA is performing the above orders under the direction of Dr. Curiel, who has provided verbal consent for supervision.    Followup: Return in about 3 months (around 3/22/2018) for anxiety.  Please note that this dictation was created using voice recognition software. I have made every reasonable attempt to correct obvious errors, but I expect that there are errors of grammar and possibly content that I did not discover before finalizing the note.

## 2018-03-05 ENCOUNTER — APPOINTMENT (RX ONLY)
Dept: URBAN - METROPOLITAN AREA CLINIC 4 | Facility: CLINIC | Age: 48
Setting detail: DERMATOLOGY
End: 2018-03-05

## 2018-03-05 DIAGNOSIS — D22 MELANOCYTIC NEVI: ICD-10-CM

## 2018-03-05 DIAGNOSIS — D18.0 HEMANGIOMA: ICD-10-CM

## 2018-03-05 DIAGNOSIS — L82.1 OTHER SEBORRHEIC KERATOSIS: ICD-10-CM

## 2018-03-05 DIAGNOSIS — L81.4 OTHER MELANIN HYPERPIGMENTATION: ICD-10-CM

## 2018-03-05 DIAGNOSIS — Z71.89 OTHER SPECIFIED COUNSELING: ICD-10-CM

## 2018-03-05 DIAGNOSIS — L91.8 OTHER HYPERTROPHIC DISORDERS OF THE SKIN: ICD-10-CM

## 2018-03-05 PROBLEM — D22.72 MELANOCYTIC NEVI OF LEFT LOWER LIMB, INCLUDING HIP: Status: ACTIVE | Noted: 2018-03-05

## 2018-03-05 PROBLEM — D22.5 MELANOCYTIC NEVI OF TRUNK: Status: ACTIVE | Noted: 2018-03-05

## 2018-03-05 PROBLEM — D22.71 MELANOCYTIC NEVI OF RIGHT LOWER LIMB, INCLUDING HIP: Status: ACTIVE | Noted: 2018-03-05

## 2018-03-05 PROBLEM — D22.39 MELANOCYTIC NEVI OF OTHER PARTS OF FACE: Status: ACTIVE | Noted: 2018-03-05

## 2018-03-05 PROBLEM — F41.9 ANXIETY DISORDER, UNSPECIFIED: Status: ACTIVE | Noted: 2018-03-05

## 2018-03-05 PROBLEM — D18.01 HEMANGIOMA OF SKIN AND SUBCUTANEOUS TISSUE: Status: ACTIVE | Noted: 2018-03-05

## 2018-03-05 PROBLEM — D22.62 MELANOCYTIC NEVI OF LEFT UPPER LIMB, INCLUDING SHOULDER: Status: ACTIVE | Noted: 2018-03-05

## 2018-03-05 PROBLEM — D23.72 OTHER BENIGN NEOPLASM OF SKIN OF LEFT LOWER LIMB, INCLUDING HIP: Status: ACTIVE | Noted: 2018-03-05

## 2018-03-05 PROBLEM — I10 ESSENTIAL (PRIMARY) HYPERTENSION: Status: ACTIVE | Noted: 2018-03-05

## 2018-03-05 PROBLEM — D22.61 MELANOCYTIC NEVI OF RIGHT UPPER LIMB, INCLUDING SHOULDER: Status: ACTIVE | Noted: 2018-03-05

## 2018-03-05 PROCEDURE — 99203 OFFICE O/P NEW LOW 30 MIN: CPT

## 2018-03-05 PROCEDURE — ? ADDITIONAL NOTES

## 2018-03-05 PROCEDURE — ? COUNSELING

## 2018-03-05 ASSESSMENT — LOCATION DETAILED DESCRIPTION DERM
LOCATION DETAILED: LEFT LATERAL FOREHEAD
LOCATION DETAILED: RIGHT SUPERIOR CENTRAL MALAR CHEEK
LOCATION DETAILED: RIGHT ANTERIOR PROXIMAL THIGH
LOCATION DETAILED: LOWER STERNUM
LOCATION DETAILED: INFERIOR THORACIC SPINE
LOCATION DETAILED: RIGHT ANTERIOR DISTAL UPPER ARM
LOCATION DETAILED: LEFT SUPERIOR MEDIAL UPPER BACK
LOCATION DETAILED: RIGHT DISTAL POSTERIOR UPPER ARM
LOCATION DETAILED: RIGHT LATERAL MALAR CHEEK
LOCATION DETAILED: RIGHT INFERIOR LATERAL NECK
LOCATION DETAILED: LEFT ANTERIOR DISTAL THIGH
LOCATION DETAILED: LEFT INFERIOR LATERAL NECK
LOCATION DETAILED: SUPERIOR THORACIC SPINE
LOCATION DETAILED: RIGHT ANTERIOR DISTAL THIGH
LOCATION DETAILED: LEFT DISTAL POSTERIOR UPPER ARM
LOCATION DETAILED: LEFT ANTERIOR PROXIMAL THIGH
LOCATION DETAILED: LEFT INFERIOR MEDIAL FOREHEAD
LOCATION DETAILED: MIDDLE STERNUM
LOCATION DETAILED: LEFT SUPERIOR ANTERIOR NECK
LOCATION DETAILED: LEFT MEDIAL UPPER BACK
LOCATION DETAILED: LEFT ANTERIOR DISTAL UPPER ARM
LOCATION DETAILED: EPIGASTRIC SKIN
LOCATION DETAILED: RIGHT LATERAL EYEBROW

## 2018-03-05 ASSESSMENT — LOCATION SIMPLE DESCRIPTION DERM
LOCATION SIMPLE: RIGHT UPPER ARM
LOCATION SIMPLE: LEFT UPPER ARM
LOCATION SIMPLE: LEFT UPPER BACK
LOCATION SIMPLE: LEFT FOREHEAD
LOCATION SIMPLE: UPPER BACK
LOCATION SIMPLE: RIGHT CHEEK
LOCATION SIMPLE: ABDOMEN
LOCATION SIMPLE: RIGHT ANTERIOR NECK
LOCATION SIMPLE: CHEST
LOCATION SIMPLE: RIGHT THIGH
LOCATION SIMPLE: LEFT THIGH
LOCATION SIMPLE: LEFT ANTERIOR NECK
LOCATION SIMPLE: RIGHT EYEBROW

## 2018-03-05 ASSESSMENT — LOCATION ZONE DERM
LOCATION ZONE: FACE
LOCATION ZONE: LEG
LOCATION ZONE: TRUNK
LOCATION ZONE: NECK
LOCATION ZONE: ARM

## 2018-04-23 ENCOUNTER — OFFICE VISIT (OUTPATIENT)
Dept: MEDICAL GROUP | Facility: PHYSICIAN GROUP | Age: 48
End: 2018-04-23
Payer: COMMERCIAL

## 2018-04-23 VITALS
RESPIRATION RATE: 20 BRPM | DIASTOLIC BLOOD PRESSURE: 82 MMHG | BODY MASS INDEX: 23.84 KG/M2 | OXYGEN SATURATION: 93 % | SYSTOLIC BLOOD PRESSURE: 130 MMHG | WEIGHT: 166.49 LBS | TEMPERATURE: 98.9 F | HEART RATE: 88 BPM | HEIGHT: 70 IN

## 2018-04-23 DIAGNOSIS — Z12.11 SCREENING FOR COLON CANCER: ICD-10-CM

## 2018-04-23 DIAGNOSIS — F10.10 ALCOHOL ABUSE, DAILY USE: ICD-10-CM

## 2018-04-23 DIAGNOSIS — R79.89 LOW VITAMIN D LEVEL: ICD-10-CM

## 2018-04-23 DIAGNOSIS — Z76.89 ENCOUNTER TO ESTABLISH CARE WITH NEW DOCTOR: ICD-10-CM

## 2018-04-23 DIAGNOSIS — F41.9 ANXIETY: ICD-10-CM

## 2018-04-23 DIAGNOSIS — Z13.220 LIPID SCREENING: ICD-10-CM

## 2018-04-23 DIAGNOSIS — I10 ESSENTIAL HYPERTENSION: Chronic | ICD-10-CM

## 2018-04-23 PROBLEM — Z12.83 SKIN CANCER SCREENING: Status: RESOLVED | Noted: 2017-12-22 | Resolved: 2018-04-23

## 2018-04-23 PROCEDURE — 99214 OFFICE O/P EST MOD 30 MIN: CPT | Performed by: NURSE PRACTITIONER

## 2018-04-23 RX ORDER — ENALAPRIL MALEATE AND HYDROCHLOROTHIAZIDE 5; 12.5 MG/1; MG/1
1 TABLET ORAL
Qty: 90 TAB | Refills: 3 | Status: SHIPPED | OUTPATIENT
Start: 2018-04-23 | End: 2018-10-10 | Stop reason: SDUPTHER

## 2018-04-23 ASSESSMENT — PATIENT HEALTH QUESTIONNAIRE - PHQ9: CLINICAL INTERPRETATION OF PHQ2 SCORE: 0

## 2018-04-23 NOTE — PROGRESS NOTES
Chief Complaint   Patient presents with   • Hypertension     medication refill       HISTORY OF PRESENT ILLNESS: Patient is a 47 y.o. male new patient who presents today to discuss the following issues:    Encounter to establish care with new doctor  Is here to establish with a new primary care provider.  Was previously seen by Rebeca Mejía.    Essential hypertension  Chronic in nature.  Stable on meds.  Due for labs and refills.      Anxiety  Stable on lorazepam.  Does not need refills at this time.    Alcohol abuse, daily use  Has cut back on how much he drinks.  Still drinks a couple of drinks a day, but doing better.      Patient Active Problem List    Diagnosis Date Noted   • Encounter to establish care with new doctor 2018   • Herpes labialis 2017   • Anxiety 2017   • Essential hypertension 2017   • Alcohol abuse, daily use 2017   • Tobacco dependence 2017       Allergies:Patient has no known allergies.    Current Outpatient Prescriptions   Medication Sig Dispense Refill   • ENALAPRIL MALEATE-HCTZ 5-12.5 MG Tab Take 1 Tab by mouth 1 time daily as needed. 90 Tab 3   • nicotine (NICODERM) 7 MG/24HR PATCH 24 HR Apply 1 Patch to skin as directed every 24 hours. 30 Patch 2   • valacyclovir (VALTREX) 500 MG Tab Take 4 Tabs by mouth every 12 hours as needed (for one day). 240 Tab 1     No current facility-administered medications for this visit.        Social History   Substance Use Topics   • Smoking status: Current Every Day Smoker     Packs/day: 0.50     Years: 20.00   • Smokeless tobacco: Never Used   • Alcohol use 16.8 oz/week     28 Standard drinks or equivalent per week       Family Status   Relation Status   • Mother Alive   • Father Alive   • Sister Alive   • Maternal Grandmother Alive   • Maternal Grandfather    • Paternal Grandmother    • Paternal Grandfather     alcoholic cirrhosis     Family History   Problem Relation Age of Onset   •  "Hypertension Mother    • Heart Disease Father    • Hypertension Father    • Cancer Father      leukemia   • Hypertension Sister    • Thyroid Sister    • Cancer Maternal Grandfather      bone   • Diabetes Paternal Grandmother    • Alcohol/Drug Paternal Grandfather        Review of Systems:   Constitutional: Negative for fever, chills, weight loss and malaise/fatigue.   HENT: Negative for ear pain, nosebleeds, congestion, sore throat and neck pain.    Eyes: Negative for blurred vision.   Respiratory: Negative for cough, sputum production, shortness of breath and wheezing.    Cardiovascular: Negative for chest pain, palpitations, orthopnea and leg swelling.   Gastrointestinal: Negative for heartburn, nausea, vomiting and abdominal pain.   Genitourinary: Negative for dysuria, urgency and frequency.   Musculoskeletal: Negative for myalgias, joint pain, and back pain.  Skin: Negative for rash and itching.   Neurological: Negative for dizziness, tingling, tremors, sensory change, focal weakness and headaches.   Endo/Heme/Allergies: Does not bruise/bleed easily.   Psychiatric/Behavioral: Negative for depression, suicidal ideas and memory loss.  The patient is not nervous/anxious and does not have insomnia.    All other systems reviewed and are negative except as in HPI.    Exam:  Blood pressure 130/82, pulse 88, temperature 37.2 °C (98.9 °F), resp. rate 20, height 1.778 m (5' 10\"), weight 75.5 kg (166 lb 7.9 oz), SpO2 93 %.  General:  Well nourished, well developed male in NAD  Head: Grossly normal.  Neck: Supple without JVD or bruit. Thyroid is not enlarged.  Pulmonary: Clear to ausculation. Normal effort. No rales, ronchi, or wheezing.  Cardiovascular: Regular rate and rhythm without murmur.   Abdomen:  Bowel sounds + x 4. Soft, non-tender, nondistended.  Extremities: No clubbing, cyanosis, or edema.  Skin: Intact with no obvious rashes or lesions.  Neuro: Grossly intact.  Psych: Alert and oriented x 3.  Mood and affect " appropriate.    Medical decision-making and discussion: Chiki is here to establish with a new primary care provider.  We reviewed his past medical history and discussed his current medications.  Lab work was ordered and his enalapril was refilled. He will sign a records release for his previous provider, he will sign up with [facility], and he will plan to follow-up here as needed.         Assessment/Plan:  1. Screening for colon cancer     2. Essential hypertension  ENALAPRIL MALEATE-HCTZ 5-12.5 MG Tab    CBC WITH DIFFERENTIAL    COMP METABOLIC PANEL    Continue current medication   3. Encounter to establish care with new doctor     4. Lipid screening  LIPID PROFILE   5. Low vitamin D level  VITAMIN D,25 HYDROXY   6. Anxiety     7. Alcohol abuse, daily use         Return if symptoms worsen or fail to improve.    Please note that this dictation was created using voice recognition software. I have made every reasonable attempt to correct obvious errors, but I expect that there are errors of grammar and possibly content that I did not discover before finalizing the note.

## 2018-05-19 ENCOUNTER — HOSPITAL ENCOUNTER (OUTPATIENT)
Dept: LAB | Facility: MEDICAL CENTER | Age: 48
End: 2018-05-19
Attending: NURSE PRACTITIONER
Payer: COMMERCIAL

## 2018-05-19 DIAGNOSIS — I10 ESSENTIAL HYPERTENSION: Chronic | ICD-10-CM

## 2018-05-19 DIAGNOSIS — R79.89 LOW VITAMIN D LEVEL: ICD-10-CM

## 2018-05-19 DIAGNOSIS — Z13.220 LIPID SCREENING: ICD-10-CM

## 2018-05-19 LAB
25(OH)D3 SERPL-MCNC: 20 NG/ML (ref 30–100)
ALBUMIN SERPL BCP-MCNC: 4.8 G/DL (ref 3.2–4.9)
ALBUMIN/GLOB SERPL: 1.9 G/DL
ALP SERPL-CCNC: 45 U/L (ref 30–99)
ALT SERPL-CCNC: 21 U/L (ref 2–50)
ANION GAP SERPL CALC-SCNC: 9 MMOL/L (ref 0–11.9)
AST SERPL-CCNC: 21 U/L (ref 12–45)
BASOPHILS # BLD AUTO: 0.2 % (ref 0–1.8)
BASOPHILS # BLD: 0.02 K/UL (ref 0–0.12)
BILIRUB SERPL-MCNC: 0.6 MG/DL (ref 0.1–1.5)
BUN SERPL-MCNC: 11 MG/DL (ref 8–22)
CALCIUM SERPL-MCNC: 9.5 MG/DL (ref 8.5–10.5)
CHLORIDE SERPL-SCNC: 106 MMOL/L (ref 96–112)
CHOLEST SERPL-MCNC: 218 MG/DL (ref 100–199)
CO2 SERPL-SCNC: 25 MMOL/L (ref 20–33)
CREAT SERPL-MCNC: 0.97 MG/DL (ref 0.5–1.4)
EOSINOPHIL # BLD AUTO: 0.22 K/UL (ref 0–0.51)
EOSINOPHIL NFR BLD: 2.6 % (ref 0–6.9)
ERYTHROCYTE [DISTWIDTH] IN BLOOD BY AUTOMATED COUNT: 43.1 FL (ref 35.9–50)
GLOBULIN SER CALC-MCNC: 2.5 G/DL (ref 1.9–3.5)
GLUCOSE SERPL-MCNC: 95 MG/DL (ref 65–99)
HCT VFR BLD AUTO: 44.4 % (ref 42–52)
HDLC SERPL-MCNC: 66 MG/DL
HGB BLD-MCNC: 15.2 G/DL (ref 14–18)
IMM GRANULOCYTES # BLD AUTO: 0.02 K/UL (ref 0–0.11)
IMM GRANULOCYTES NFR BLD AUTO: 0.2 % (ref 0–0.9)
LDLC SERPL CALC-MCNC: 138 MG/DL
LYMPHOCYTES # BLD AUTO: 3.52 K/UL (ref 1–4.8)
LYMPHOCYTES NFR BLD: 41.5 % (ref 22–41)
MCH RBC QN AUTO: 32.3 PG (ref 27–33)
MCHC RBC AUTO-ENTMCNC: 34.2 G/DL (ref 33.7–35.3)
MCV RBC AUTO: 94.3 FL (ref 81.4–97.8)
MONOCYTES # BLD AUTO: 0.65 K/UL (ref 0–0.85)
MONOCYTES NFR BLD AUTO: 7.7 % (ref 0–13.4)
NEUTROPHILS # BLD AUTO: 4.06 K/UL (ref 1.82–7.42)
NEUTROPHILS NFR BLD: 47.8 % (ref 44–72)
NRBC # BLD AUTO: 0 K/UL
NRBC BLD-RTO: 0 /100 WBC
PLATELET # BLD AUTO: 329 K/UL (ref 164–446)
PMV BLD AUTO: 10.2 FL (ref 9–12.9)
POTASSIUM SERPL-SCNC: 4.2 MMOL/L (ref 3.6–5.5)
PROT SERPL-MCNC: 7.3 G/DL (ref 6–8.2)
RBC # BLD AUTO: 4.71 M/UL (ref 4.7–6.1)
SODIUM SERPL-SCNC: 140 MMOL/L (ref 135–145)
TRIGL SERPL-MCNC: 72 MG/DL (ref 0–149)
WBC # BLD AUTO: 8.5 K/UL (ref 4.8–10.8)

## 2018-05-19 PROCEDURE — 82306 VITAMIN D 25 HYDROXY: CPT

## 2018-05-19 PROCEDURE — 36415 COLL VENOUS BLD VENIPUNCTURE: CPT

## 2018-05-19 PROCEDURE — 85025 COMPLETE CBC W/AUTO DIFF WBC: CPT

## 2018-05-19 PROCEDURE — 80061 LIPID PANEL: CPT

## 2018-05-19 PROCEDURE — 80053 COMPREHEN METABOLIC PANEL: CPT

## 2018-05-21 ENCOUNTER — TELEPHONE (OUTPATIENT)
Dept: MEDICAL GROUP | Facility: PHYSICIAN GROUP | Age: 48
End: 2018-05-21

## 2018-05-22 DIAGNOSIS — F41.9 ANXIETY: Chronic | ICD-10-CM

## 2018-05-22 RX ORDER — LORAZEPAM 0.5 MG/1
0.5 TABLET ORAL DAILY
Qty: 30 TAB | Refills: 2 | Status: SHIPPED
Start: 2018-05-22 | End: 2018-06-21

## 2018-10-10 ENCOUNTER — OFFICE VISIT (OUTPATIENT)
Dept: MEDICAL GROUP | Facility: PHYSICIAN GROUP | Age: 48
End: 2018-10-10
Payer: COMMERCIAL

## 2018-10-10 VITALS
OXYGEN SATURATION: 95 % | HEART RATE: 100 BPM | DIASTOLIC BLOOD PRESSURE: 82 MMHG | HEIGHT: 70 IN | BODY MASS INDEX: 23.65 KG/M2 | WEIGHT: 165.2 LBS | RESPIRATION RATE: 12 BRPM | SYSTOLIC BLOOD PRESSURE: 118 MMHG | TEMPERATURE: 98.6 F

## 2018-10-10 DIAGNOSIS — I10 ESSENTIAL HYPERTENSION: ICD-10-CM

## 2018-10-10 DIAGNOSIS — F41.9 ANXIETY: ICD-10-CM

## 2018-10-10 DIAGNOSIS — Z23 NEED FOR VACCINATION: ICD-10-CM

## 2018-10-10 DIAGNOSIS — I10 ESSENTIAL HYPERTENSION: Chronic | ICD-10-CM

## 2018-10-10 PROBLEM — Z76.89 ENCOUNTER TO ESTABLISH CARE WITH NEW DOCTOR: Status: RESOLVED | Noted: 2018-04-23 | Resolved: 2018-10-10

## 2018-10-10 PROCEDURE — 99214 OFFICE O/P EST MOD 30 MIN: CPT | Mod: 25 | Performed by: NURSE PRACTITIONER

## 2018-10-10 PROCEDURE — 90686 IIV4 VACC NO PRSV 0.5 ML IM: CPT | Performed by: NURSE PRACTITIONER

## 2018-10-10 PROCEDURE — 90471 IMMUNIZATION ADMIN: CPT | Performed by: NURSE PRACTITIONER

## 2018-10-10 RX ORDER — LORAZEPAM 0.5 MG/1
0.5 TABLET ORAL
Qty: 30 TAB | Refills: 5 | Status: SHIPPED | OUTPATIENT
Start: 2018-10-10 | End: 2019-04-19 | Stop reason: SDUPTHER

## 2018-10-10 RX ORDER — ENALAPRIL MALEATE AND HYDROCHLOROTHIAZIDE 5; 12.5 MG/1; MG/1
1 TABLET ORAL
Qty: 90 TAB | Refills: 3 | Status: SHIPPED | OUTPATIENT
Start: 2018-10-10 | End: 2019-10-17 | Stop reason: SDUPTHER

## 2018-10-10 RX ORDER — LORAZEPAM 0.5 MG/1
TABLET ORAL
Refills: 1 | COMMUNITY
Start: 2018-09-01 | End: 2018-10-10 | Stop reason: SDUPTHER

## 2018-10-10 NOTE — PROGRESS NOTES
Chief Complaint   Patient presents with   • Anxiety     refill   • Hypertension     refill       HISTORY OF PRESENT ILLNESS: Patient is a 48 y.o. male established patient who presents today to discuss the following issues:    Essential hypertension  Chronic in nature.  Stable on meds.  Due for refills.    Anxiety  Anxiety has been stable.  Takes 1/2 a lorazepam a day.  Would like a refill.    Need for vaccination  Would like a flu shot today.        Patient Active Problem List    Diagnosis Date Noted   • Need for vaccination 10/10/2018   • Herpes labialis 2017   • Anxiety 2017   • Essential hypertension 2017   • Alcohol abuse, daily use 2017   • Tobacco dependence 2017       Allergies:Patient has no known allergies.    Current Outpatient Prescriptions   Medication Sig Dispense Refill   • ENALAPRIL MALEATE-HCTZ 5-12.5 MG Tab Take 1 Tab by mouth 1 time daily as needed. 90 Tab 3   • LORazepam (ATIVAN) 0.5 MG Tab Take 1 Tab by mouth 1 time daily as needed for Anxiety for up to 30 days. 30 Tab 5   • valacyclovir (VALTREX) 500 MG Tab Take 4 Tabs by mouth every 12 hours as needed (for one day). 240 Tab 1     No current facility-administered medications for this visit.        Social History   Substance Use Topics   • Smoking status: Current Every Day Smoker     Packs/day: 0.50     Years: 20.00   • Smokeless tobacco: Never Used   • Alcohol use 16.8 oz/week     28 Standard drinks or equivalent per week       Family Status   Relation Status   • Mo Alive   • Fa Alive   • Sis Alive   • MGMo Alive   • MGFa    • PGMo    • PGFa         alcoholic cirrhosis     Family History   Problem Relation Age of Onset   • Hypertension Mother    • Heart Disease Father    • Hypertension Father    • Cancer Father         leukemia   • Hypertension Sister    • Thyroid Sister    • Cancer Maternal Grandfather         bone   • Diabetes Paternal Grandmother    • Alcohol/Drug Paternal Grandfather   "      Review of Systems:   Constitutional: Negative for fever, chills, weight loss and malaise/fatigue.   HENT: Negative for ear pain, nosebleeds, congestion, sore throat and neck pain.    Eyes: Negative for blurred vision.   Respiratory: Negative for cough, sputum production, shortness of breath and wheezing.    Cardiovascular: Negative for chest pain, palpitations, orthopnea and leg swelling.   Gastrointestinal: Negative for heartburn, nausea, vomiting and abdominal pain.   Genitourinary: Negative for dysuria, urgency and frequency.   Musculoskeletal: Negative for myalgias, joint pain, and back pain.  Skin: Negative for rash and itching.   Neurological: Negative for dizziness, tingling, tremors, sensory change, focal weakness and headaches.   Endo/Heme/Allergies: Does not bruise/bleed easily.   Psychiatric/Behavioral: Negative for depression, suicidal ideas and memory loss.  Positive for stable anxiety.  All other systems reviewed and are negative except as in HPI.    Exam:  Blood pressure 118/82, pulse 100, temperature 37 °C (98.6 °F), temperature source Temporal, resp. rate 12, height 1.778 m (5' 10\"), weight 74.9 kg (165 lb 3.2 oz), SpO2 95 %.  General:  Well nourished, well developed male in NAD  Head: Grossly normal.  Neck: Supple without JVD or bruit. Thyroid is not enlarged.  Pulmonary: Clear to ausculation. Normal effort. No rales, ronchi, or wheezing.  Cardiovascular: Regular rate and rhythm without murmur.   Abdomen:  Soft, nontender, nondistended.  Extremities: No clubbing, cyanosis, or edema.  Skin: Intact with no obvious rashes or lesions.  Neuro: Grossly intact.  Psych: Alert and oriented x 3.  Mood and affect appropriate.    Medical decision-making and discussion: Chiki is here today for follow-up.  His medications were refilled and he was given a flu shot.  He will follow-up here as needed.    I have placed the below orders and discussed them with an approved delegating provider. The MA is " performing the below orders under the direction of Dr. Curiel, who have provided verbal consent for supervision.            Assessment/Plan:  1. Need for vaccination  Influenza Vaccine Quad Injection >3Y (PF)   2. Essential hypertension  ENALAPRIL MALEATE-HCTZ 5-12.5 MG Tab   3. Essential hypertension  ENALAPRIL MALEATE-HCTZ 5-12.5 MG Tab    Continue current medication   4. Anxiety  LORazepam (ATIVAN) 0.5 MG Tab       Return if symptoms worsen or fail to improve.    Please note that this dictation was created using voice recognition software. I have made every reasonable attempt to correct obvious errors, but I expect that there are errors of grammar and possibly content that I did not discover before finalizing the note.

## 2019-04-19 DIAGNOSIS — F41.9 ANXIETY: ICD-10-CM

## 2019-04-23 RX ORDER — LORAZEPAM 0.5 MG/1
0.5 TABLET ORAL
Qty: 30 TAB | Refills: 0 | Status: SHIPPED
Start: 2019-04-23 | End: 2019-05-08

## 2019-05-08 ENCOUNTER — OFFICE VISIT (OUTPATIENT)
Dept: MEDICAL GROUP | Facility: PHYSICIAN GROUP | Age: 49
End: 2019-05-08
Payer: COMMERCIAL

## 2019-05-08 VITALS
BODY MASS INDEX: 24.62 KG/M2 | SYSTOLIC BLOOD PRESSURE: 122 MMHG | OXYGEN SATURATION: 96 % | HEIGHT: 70 IN | WEIGHT: 172 LBS | DIASTOLIC BLOOD PRESSURE: 76 MMHG | TEMPERATURE: 98.7 F | HEART RATE: 101 BPM | RESPIRATION RATE: 16 BRPM

## 2019-05-08 DIAGNOSIS — F41.9 ANXIETY: ICD-10-CM

## 2019-05-08 DIAGNOSIS — Z23 NEED FOR VACCINATION: ICD-10-CM

## 2019-05-08 DIAGNOSIS — Z00.00 WELLNESS EXAMINATION: ICD-10-CM

## 2019-05-08 PROCEDURE — 99214 OFFICE O/P EST MOD 30 MIN: CPT | Performed by: NURSE PRACTITIONER

## 2019-05-08 RX ORDER — LORAZEPAM 1 MG/1
1 TABLET ORAL
Qty: 90 TAB | Refills: 1 | Status: SHIPPED | OUTPATIENT
Start: 2019-05-08 | End: 2019-10-17 | Stop reason: SDUPTHER

## 2019-05-08 RX ORDER — LORAZEPAM 1 MG/1
TABLET ORAL
Refills: 1 | COMMUNITY
Start: 2019-03-16 | End: 2019-05-08 | Stop reason: SDUPTHER

## 2019-05-08 ASSESSMENT — PATIENT HEALTH QUESTIONNAIRE - PHQ9: CLINICAL INTERPRETATION OF PHQ2 SCORE: 0

## 2019-05-08 NOTE — PROGRESS NOTES
Chief Complaint   Patient presents with   • Medication Refill       HISTORY OF PRESENT ILLNESS: Patient is a 48 y.o. male established patient who presents today to discuss the following issues:    Anxiety  Chronic in nature.  Stable on meds.  Due for refills of lorazepam.   reviewed.      Patient Active Problem List    Diagnosis Date Noted   • Need for vaccination 10/10/2018   • Herpes labialis 2017   • Anxiety 2017   • Essential hypertension 2017   • Alcohol abuse, daily use 2017   • Tobacco dependence 2017       Allergies:Patient has no known allergies.    Current Outpatient Prescriptions   Medication Sig Dispense Refill   • LORazepam (ATIVAN) 1 MG Tab Take 1 Tab by mouth 1 time daily as needed for Anxiety for up to 90 days. 90 Tab 1   • ENALAPRIL MALEATE-HCTZ 5-12.5 MG Tab Take 1 Tab by mouth 1 time daily as needed. 90 Tab 3   • valacyclovir (VALTREX) 500 MG Tab Take 4 Tabs by mouth every 12 hours as needed (for one day). (Patient not taking: Reported on 2019) 240 Tab 1     No current facility-administered medications for this visit.        Social History   Substance Use Topics   • Smoking status: Current Every Day Smoker     Packs/day: 0.50     Years: 20.00   • Smokeless tobacco: Never Used   • Alcohol use 29.4 oz/week     21 Shots of liquor, 28 Standard drinks or equivalent per week       Family Status   Relation Status   • Mo Alive   • Fa Alive   • Sis Alive   • MGMo Alive   • MGFa    • PGMo    • PGFa         alcoholic cirrhosis     Family History   Problem Relation Age of Onset   • Hypertension Mother    • Heart Disease Father    • Hypertension Father    • Cancer Father         leukemia   • Hypertension Sister    • Thyroid Sister    • Cancer Maternal Grandfather         bone   • Diabetes Paternal Grandmother    • Alcohol/Drug Paternal Grandfather        Review of Systems:   Constitutional: Negative for fever, chills, weight loss and malaise/fatigue.  "  HENT: Negative for ear pain, nosebleeds, congestion, sore throat and neck pain.    Eyes: Negative for blurred vision.   Respiratory: Negative for cough, sputum production, shortness of breath and wheezing.    Cardiovascular: Negative for chest pain, palpitations, orthopnea and leg swelling.   Gastrointestinal: Negative for heartburn, nausea, vomiting and abdominal pain.   Genitourinary: Negative for dysuria, urgency and frequency.   Musculoskeletal: Negative for myalgias, joint pain, and back pain.  Skin: Negative for rash and itching.   Neurological: Negative for dizziness, tingling, tremors, sensory change, focal weakness and headaches.   Endo/Heme/Allergies: Does not bruise/bleed easily.   Psychiatric/Behavioral: Negative for depression, suicidal ideas and memory loss.  Positive for stable anxiety.  All other systems reviewed and are negative except as in HPI.    Exam:  /76   Pulse (!) 101   Temp 37.1 °C (98.7 °F)   Resp 16   Ht 1.778 m (5' 10\")   Wt 78 kg (172 lb)   SpO2 96%   General:  Well nourished, well developed male in NAD  Head: Grossly normal.  Neck: Supple without JVD or bruit. Thyroid is not enlarged.  Pulmonary: Clear to ausculation. Normal effort. No rales, ronchi, or wheezing.  Cardiovascular: Regular rate and rhythm without murmur.   Abdomen:  Soft, nontender, nondistended.  Extremities: No clubbing, cyanosis, or edema.  Skin: Intact with no obvious rashes or lesions.  Neuro: Grossly intact.  Psych: Alert and oriented x 3.  Mood and affect appropriate.    Medical decision-making and discussion: Chiki is here today for follow-up.  Lab work was ordered and his lorazepam was refilled.  He will follow-up here as needed.          Assessment/Plan:  1. Need for vaccination  CANCELED: TDAP VACCINE =>6YO IM   2. Anxiety  LORazepam (ATIVAN) 1 MG Tab   3. Wellness examination  CBC WITH DIFFERENTIAL    Comp Metabolic Panel    Lipid Profile    VITAMIN D,25 HYDROXY       Return if symptoms worsen " or fail to improve.    Please note that this dictation was created using voice recognition software. I have made every reasonable attempt to correct obvious errors, but I expect that there are errors of grammar and possibly content that I did not discover before finalizing the note.

## 2019-10-12 ENCOUNTER — HOSPITAL ENCOUNTER (OUTPATIENT)
Dept: LAB | Facility: MEDICAL CENTER | Age: 49
End: 2019-10-12
Attending: NURSE PRACTITIONER
Payer: COMMERCIAL

## 2019-10-12 DIAGNOSIS — Z00.00 WELLNESS EXAMINATION: ICD-10-CM

## 2019-10-12 LAB
25(OH)D3 SERPL-MCNC: 16 NG/ML (ref 30–100)
ALBUMIN SERPL BCP-MCNC: 4.6 G/DL (ref 3.2–4.9)
ALBUMIN/GLOB SERPL: 1.5 G/DL
ALP SERPL-CCNC: 50 U/L (ref 30–99)
ALT SERPL-CCNC: 33 U/L (ref 2–50)
ANION GAP SERPL CALC-SCNC: 10 MMOL/L (ref 0–11.9)
AST SERPL-CCNC: 33 U/L (ref 12–45)
BASOPHILS # BLD AUTO: 0 % (ref 0–1.8)
BASOPHILS # BLD: 0 K/UL (ref 0–0.12)
BILIRUB SERPL-MCNC: 0.4 MG/DL (ref 0.1–1.5)
BUN SERPL-MCNC: 11 MG/DL (ref 8–22)
CALCIUM SERPL-MCNC: 9.7 MG/DL (ref 8.5–10.5)
CHLORIDE SERPL-SCNC: 105 MMOL/L (ref 96–112)
CHOLEST SERPL-MCNC: 204 MG/DL (ref 100–199)
CO2 SERPL-SCNC: 25 MMOL/L (ref 20–33)
CREAT SERPL-MCNC: 0.99 MG/DL (ref 0.5–1.4)
EOSINOPHIL # BLD AUTO: 0.1 K/UL (ref 0–0.51)
EOSINOPHIL NFR BLD: 1.6 % (ref 0–6.9)
ERYTHROCYTE [DISTWIDTH] IN BLOOD BY AUTOMATED COUNT: 48.3 FL (ref 35.9–50)
GLOBULIN SER CALC-MCNC: 3 G/DL (ref 1.9–3.5)
GLUCOSE SERPL-MCNC: 99 MG/DL (ref 65–99)
HCT VFR BLD AUTO: 46.4 % (ref 42–52)
HDLC SERPL-MCNC: 54 MG/DL
HGB BLD-MCNC: 15.5 G/DL (ref 14–18)
IMM GRANULOCYTES # BLD AUTO: 0.02 K/UL (ref 0–0.11)
IMM GRANULOCYTES NFR BLD AUTO: 0.3 % (ref 0–0.9)
LDLC SERPL CALC-MCNC: 136 MG/DL
LYMPHOCYTES # BLD AUTO: 2.29 K/UL (ref 1–4.8)
LYMPHOCYTES NFR BLD: 35.8 % (ref 22–41)
MCH RBC QN AUTO: 33.7 PG (ref 27–33)
MCHC RBC AUTO-ENTMCNC: 33.4 G/DL (ref 33.7–35.3)
MCV RBC AUTO: 100.9 FL (ref 81.4–97.8)
MONOCYTES # BLD AUTO: 0.57 K/UL (ref 0–0.85)
MONOCYTES NFR BLD AUTO: 8.9 % (ref 0–13.4)
NEUTROPHILS # BLD AUTO: 3.42 K/UL (ref 1.82–7.42)
NEUTROPHILS NFR BLD: 53.4 % (ref 44–72)
NRBC # BLD AUTO: 0 K/UL
NRBC BLD-RTO: 0 /100 WBC
PLATELET # BLD AUTO: 271 K/UL (ref 164–446)
PMV BLD AUTO: 10.6 FL (ref 9–12.9)
POTASSIUM SERPL-SCNC: 4.5 MMOL/L (ref 3.6–5.5)
PROT SERPL-MCNC: 7.6 G/DL (ref 6–8.2)
RBC # BLD AUTO: 4.6 M/UL (ref 4.7–6.1)
SODIUM SERPL-SCNC: 140 MMOL/L (ref 135–145)
TRIGL SERPL-MCNC: 72 MG/DL (ref 0–149)
WBC # BLD AUTO: 6.4 K/UL (ref 4.8–10.8)

## 2019-10-12 PROCEDURE — 85025 COMPLETE CBC W/AUTO DIFF WBC: CPT

## 2019-10-12 PROCEDURE — 36415 COLL VENOUS BLD VENIPUNCTURE: CPT

## 2019-10-12 PROCEDURE — 80053 COMPREHEN METABOLIC PANEL: CPT

## 2019-10-12 PROCEDURE — 80061 LIPID PANEL: CPT

## 2019-10-12 PROCEDURE — 82306 VITAMIN D 25 HYDROXY: CPT

## 2019-10-17 ENCOUNTER — OFFICE VISIT (OUTPATIENT)
Dept: MEDICAL GROUP | Facility: PHYSICIAN GROUP | Age: 49
End: 2019-10-17
Payer: COMMERCIAL

## 2019-10-17 VITALS
OXYGEN SATURATION: 98 % | WEIGHT: 166 LBS | SYSTOLIC BLOOD PRESSURE: 132 MMHG | HEART RATE: 78 BPM | DIASTOLIC BLOOD PRESSURE: 90 MMHG | BODY MASS INDEX: 23.77 KG/M2 | HEIGHT: 70 IN | TEMPERATURE: 99.2 F | RESPIRATION RATE: 16 BRPM

## 2019-10-17 DIAGNOSIS — R79.89 LOW VITAMIN D LEVEL: ICD-10-CM

## 2019-10-17 DIAGNOSIS — I10 ESSENTIAL HYPERTENSION: ICD-10-CM

## 2019-10-17 DIAGNOSIS — B00.1 HERPES LABIALIS: ICD-10-CM

## 2019-10-17 DIAGNOSIS — F41.9 ANXIETY: ICD-10-CM

## 2019-10-17 DIAGNOSIS — F17.200 TOBACCO DEPENDENCE: ICD-10-CM

## 2019-10-17 DIAGNOSIS — Z12.12 SCREENING FOR COLORECTAL CANCER: ICD-10-CM

## 2019-10-17 DIAGNOSIS — Z23 NEED FOR VACCINATION: ICD-10-CM

## 2019-10-17 DIAGNOSIS — Z12.11 SCREENING FOR COLORECTAL CANCER: ICD-10-CM

## 2019-10-17 PROCEDURE — 90686 IIV4 VACC NO PRSV 0.5 ML IM: CPT | Performed by: NURSE PRACTITIONER

## 2019-10-17 PROCEDURE — 99214 OFFICE O/P EST MOD 30 MIN: CPT | Mod: 25 | Performed by: NURSE PRACTITIONER

## 2019-10-17 PROCEDURE — 90471 IMMUNIZATION ADMIN: CPT | Performed by: NURSE PRACTITIONER

## 2019-10-17 PROCEDURE — 90472 IMMUNIZATION ADMIN EACH ADD: CPT | Performed by: NURSE PRACTITIONER

## 2019-10-17 PROCEDURE — 90715 TDAP VACCINE 7 YRS/> IM: CPT | Performed by: NURSE PRACTITIONER

## 2019-10-17 RX ORDER — LORAZEPAM 1 MG/1
1 TABLET ORAL
Refills: 0 | COMMUNITY
Start: 2019-10-05 | End: 2020-04-30 | Stop reason: SDUPTHER

## 2019-10-17 RX ORDER — ENALAPRIL MALEATE AND HYDROCHLOROTHIAZIDE 5; 12.5 MG/1; MG/1
1 TABLET ORAL
Qty: 90 TAB | Refills: 3 | Status: SHIPPED | OUTPATIENT
Start: 2019-10-17 | End: 2020-11-10 | Stop reason: SDUPTHER

## 2019-10-17 RX ORDER — ERGOCALCIFEROL 1.25 MG/1
CAPSULE ORAL
Qty: 16 CAP | Refills: 0 | Status: SHIPPED | OUTPATIENT
Start: 2019-10-17 | End: 2021-04-08

## 2019-10-17 RX ORDER — LORAZEPAM 1 MG/1
1 TABLET ORAL
Qty: 90 TAB | Refills: 1 | Status: SHIPPED | OUTPATIENT
Start: 2019-10-17 | End: 2020-01-15

## 2019-10-17 NOTE — PROGRESS NOTES
Chief Complaint   Patient presents with   • Medication Refill   • Nicotine Dependence   • Medication Management       HISTORY OF PRESENT ILLNESS: Patient is a 49 y.o. male established patient who presents today to discuss the following issues:    Tobacco dependence  Would like to start Chantix to quit smoking.  Discussed plan.    Essential hypertension  Chronic in nature.  Stable on meds.  Recent lab results reviewed.  Due for refills.      Low vitamin D level  Ergocalciferol sent to his pharmacy.    Anxiety  Stable.  Would like a refill of Ativan.   reviewed.    Screening for colorectal cancer  Due for Cologuard.    Need for vaccination  Due for Tdap and flu today.      Patient Active Problem List    Diagnosis Date Noted   • Low vitamin D level 10/28/2019   • Screening for colorectal cancer 10/28/2019   • Need for vaccination 10/10/2018   • Herpes labialis 12/22/2017   • Anxiety 04/28/2017   • Essential hypertension 04/28/2017   • Alcohol abuse, daily use 04/28/2017   • Tobacco dependence 04/28/2017       Allergies:Patient has no known allergies.    Current Outpatient Medications   Medication Sig Dispense Refill   • LORazepam (ATIVAN) 1 MG Tab Take 1 mg by mouth.  0   • ENALAPRIL MALEATE-HCTZ 5-12.5 MG Tab Take 1 Tab by mouth 1 time daily as needed. 90 Tab 3   • LORazepam (ATIVAN) 1 MG Tab Take 1 Tab by mouth 1 time daily as needed for Anxiety for up to 90 days. 90 Tab 1   • ergocalciferol (DRISDOL) 78899 UNIT capsule Take 1 cap by mouth every day for 10 days, and then take 1 cap by mouth every Tuesday and Saturday until prescription is completed. 16 Cap 0   • varenicline (CHANTIX STARTING MONTH PAK) 0.5 MG X 11 & 1 MG X 42 tablet Take as directed. 56 Tab 0   • valacyclovir (VALTREX) 500 MG Tab Take 4 Tabs by mouth every 12 hours as needed (for one day). 240 Tab 1     No current facility-administered medications for this visit.        Social History     Tobacco Use   • Smoking status: Current Every Day Smoker     " Packs/day: 0.50     Years: 20.00     Pack years: 10.00   • Smokeless tobacco: Never Used   Substance Use Topics   • Alcohol use: Yes     Alcohol/week: 29.4 oz     Types: 21 Shots of liquor, 28 Standard drinks or equivalent per week   • Drug use: No       Family Status   Relation Name Status   • Mo  Alive   • Fa  Alive   • Sis  Alive   • MGMo  Alive   • MGFa     • PGMo     • PGFa          alcoholic cirrhosis     Family History   Problem Relation Age of Onset   • Hypertension Mother    • Heart Disease Father    • Hypertension Father    • Cancer Father         leukemia   • Hypertension Sister    • Thyroid Sister    • Cancer Maternal Grandfather         bone   • Diabetes Paternal Grandmother    • Alcohol/Drug Paternal Grandfather        Review of Systems:   Constitutional: Negative for fever, chills, weight loss and malaise/fatigue.   HENT: Negative for ear pain, nosebleeds, congestion, sore throat and neck pain.    Eyes: Negative for blurred vision.   Respiratory: Negative for cough, sputum production, shortness of breath and wheezing.    Cardiovascular: Negative for chest pain, palpitations, orthopnea and leg swelling.   Gastrointestinal: Negative for heartburn, nausea, vomiting and abdominal pain.   Genitourinary: Negative for dysuria, urgency and frequency.   Musculoskeletal: Negative for myalgias, joint pain, and back pain.  Skin: Negative for rash and itching.   Neurological: Negative for dizziness, tingling, tremors, sensory change, focal weakness and headaches.   Endo/Heme/Allergies: Does not bruise/bleed easily.   Psychiatric/Behavioral: Negative for depression, suicidal ideas and memory loss.  Positive for stable anxiety.  All other systems reviewed and are negative except as in HPI.    Exam:  Blood Pressure 132/90   Pulse 78   Temperature 37.3 °C (99.2 °F)   Respiration 16   Height 1.778 m (5' 10\")   Weight 75.3 kg (166 lb)   Oxygen Saturation 98%   General:  Well nourished, " well developed male in NAD  Head: Grossly normal.  Neck: Supple without JVD or bruit. Thyroid is not enlarged.  Pulmonary: Clear to ausculation. Normal effort. No rales, ronchi, or wheezing.  Cardiovascular: Regular rate and rhythm without murmur.   Abdomen:  Soft, nontender, nondistended.  Extremities: No clubbing, cyanosis, or edema.  Skin: Intact with no obvious rashes or lesions.  Neuro: Grossly intact.  Psych: Alert and oriented x 3.  Mood and affect appropriate.    Medical decision-making and discussion: Chiki is here today for follow-up.  His lab results were reviewed, medications were sent to his pharmacy, cologuard was ordered, and he was given Tdap and flu shots.  He will follow-up here as needed.    I have placed the below orders and discussed them with an approved delegating provider. The MA is performing the below orders under the direction of Dr. Curiel, who have provided verbal consent for supervision.            Assessment/Plan:  1. Need for vaccination  Tdap Vaccine =>6YO IM    Influenza Vaccine Quad Injection (PF)   2. Screening for colorectal cancer  Cologuard Colon Cancer Screening (FIT DNA)   3. Herpes labialis      Valacyclovir 2000 mg every 12 hours for one day   4. Essential hypertension  ENALAPRIL MALEATE-HCTZ 5-12.5 MG Tab   5. Anxiety  LORazepam (ATIVAN) 1 MG Tab   6. Low vitamin D level  ergocalciferol (DRISDOL) 39007 UNIT capsule   7. Tobacco dependence  varenicline (CHANTIX STARTING MONTH FAUSTINA) 0.5 MG X 11 & 1 MG X 42 tablet       Return if symptoms worsen or fail to improve.    Please note that this dictation was created using voice recognition software. I have made every reasonable attempt to correct obvious errors, but I expect that there are errors of grammar and possibly content that I did not discover before finalizing the note.

## 2019-10-22 ENCOUNTER — TELEPHONE (OUTPATIENT)
Dept: MEDICAL GROUP | Facility: PHYSICIAN GROUP | Age: 49
End: 2019-10-22

## 2019-10-22 NOTE — TELEPHONE ENCOUNTER
MEDICATION PRIOR AUTHORIZATION NEEDED:    1. Name of Medication: Chantix Starting Jim 0.5 MG X 11 & 1 MG X 42 Tablets     2. Requested By (Name of Pharmacy): [facility]       3. Is insurance on file current? Yes    4. What is the name & phone number of the 3rd party payor? N/A

## 2019-10-28 PROBLEM — Z12.12 SCREENING FOR COLORECTAL CANCER: Status: ACTIVE | Noted: 2019-10-28

## 2019-10-28 PROBLEM — R79.89 LOW VITAMIN D LEVEL: Status: ACTIVE | Noted: 2019-10-28

## 2019-10-28 PROBLEM — Z12.11 SCREENING FOR COLORECTAL CANCER: Status: ACTIVE | Noted: 2019-10-28

## 2019-10-30 ENCOUNTER — TELEPHONE (OUTPATIENT)
Dept: MEDICAL GROUP | Facility: PHYSICIAN GROUP | Age: 49
End: 2019-10-30

## 2019-10-31 NOTE — TELEPHONE ENCOUNTER
FINAL PRIOR AUTHORIZATION STATUS:    1.  Name of Medication & Dose: CHANTIX STARTING MONTH FAUSTINA     2. Prior Auth Status: Denied.  Reason: PT NEEDS TO TRY OTC     3. Action Taken: Pharmacy Notified: yes Patient Notified: yes

## 2019-11-22 DIAGNOSIS — R79.89 LOW VITAMIN D LEVEL: ICD-10-CM

## 2019-11-22 RX ORDER — ERGOCALCIFEROL 1.25 MG/1
CAPSULE ORAL
Qty: 16 CAP | Refills: 0 | OUTPATIENT
Start: 2019-11-22

## 2019-11-22 NOTE — TELEPHONE ENCOUNTER
Was the patient seen in the last year in this department? Yes    Does patient have an active prescription for medications requested? No     Received Request Via: Pharmacy      Pt met protocol?: Yes   Pt last ov 10/2019    25-Hydroxy   Vitamin D 25   Date Value Ref Range Status   10/12/2019 16 (L) 30 - 100 ng/mL Final     Comment:     Adult Ranges:   <20 ng/mL - Deficiency  20-29 ng/mL - Insufficiency   ng/mL - Sufficiency  The Advia Centaur Vitamin D Assay is standardized to the  [facility] reference measurement procedures, a  reference method for the Vitamin D Standardization Program  (VDSP).  The VDSP aligns patient results among 25 (OH)  Vitamin D methods.

## 2019-11-26 ENCOUNTER — TELEPHONE (OUTPATIENT)
Dept: MEDICAL GROUP | Facility: PHYSICIAN GROUP | Age: 49
End: 2019-11-26

## 2019-11-26 DIAGNOSIS — R19.5 POSITIVE COLORECTAL CANCER SCREENING USING COLOGUARD TEST: ICD-10-CM

## 2019-11-26 NOTE — TELEPHONE ENCOUNTER
----- Message from IVON Nation sent at 11/26/2019  9:58 AM PST -----  Please call patient and let him know that his Cologuard came back positive.  I have sent a referral to GI, and he needs to be sure to follow-up with them.      Thank you!  Matt

## 2020-04-30 DIAGNOSIS — F41.9 ANXIETY: ICD-10-CM

## 2020-04-30 RX ORDER — LORAZEPAM 1 MG/1
1 TABLET ORAL
Qty: 90 TAB | Refills: 0 | Status: SHIPPED | OUTPATIENT
Start: 2020-04-30 | End: 2020-07-31 | Stop reason: SDUPTHER

## 2020-04-30 NOTE — TELEPHONE ENCOUNTER
EKG has some non specific abnormalities    Will need to repeat an EKG to r/o any actual concerns. Could we call him and send him an order? Received request via: Pharmacy    Was the patient seen in the last year in this department? Yes    Does the patient have an active prescription (recently filled or refills available) for medication(s) requested? No

## 2020-07-31 DIAGNOSIS — F41.9 ANXIETY: ICD-10-CM

## 2020-08-04 RX ORDER — LORAZEPAM 1 MG/1
1 TABLET ORAL
Qty: 90 TAB | Refills: 0 | Status: SHIPPED | OUTPATIENT
Start: 2020-08-04 | End: 2020-11-02

## 2020-08-17 ENCOUNTER — HOSPITAL ENCOUNTER (EMERGENCY)
Facility: MEDICAL CENTER | Age: 50
End: 2020-08-18
Attending: EMERGENCY MEDICINE
Payer: COMMERCIAL

## 2020-08-17 ENCOUNTER — OFFICE VISIT (OUTPATIENT)
Dept: URGENT CARE | Facility: PHYSICIAN GROUP | Age: 50
End: 2020-08-17
Payer: COMMERCIAL

## 2020-08-17 ENCOUNTER — HOSPITAL ENCOUNTER (OUTPATIENT)
Dept: RADIOLOGY | Facility: MEDICAL CENTER | Age: 50
End: 2020-08-17
Attending: PHYSICIAN ASSISTANT
Payer: COMMERCIAL

## 2020-08-17 VITALS
TEMPERATURE: 98.9 F | HEART RATE: 99 BPM | WEIGHT: 161 LBS | OXYGEN SATURATION: 98 % | BODY MASS INDEX: 23.05 KG/M2 | HEIGHT: 70 IN | SYSTOLIC BLOOD PRESSURE: 108 MMHG | DIASTOLIC BLOOD PRESSURE: 84 MMHG | RESPIRATION RATE: 16 BRPM

## 2020-08-17 DIAGNOSIS — R06.89 ABNORMAL BREATH SOUNDS: ICD-10-CM

## 2020-08-17 DIAGNOSIS — K57.92 DIVERTICULITIS: ICD-10-CM

## 2020-08-17 DIAGNOSIS — R55 SYNCOPE, UNSPECIFIED SYNCOPE TYPE: ICD-10-CM

## 2020-08-17 DIAGNOSIS — R10.9 RIGHT FLANK PAIN: ICD-10-CM

## 2020-08-17 DIAGNOSIS — R31.29 OTHER MICROSCOPIC HEMATURIA: ICD-10-CM

## 2020-08-17 LAB
APPEARANCE UR: CLEAR
APPEARANCE UR: CLEAR
BILIRUB UR QL STRIP.AUTO: NEGATIVE
BILIRUB UR STRIP-MCNC: NORMAL MG/DL
COLOR UR AUTO: YELLOW
COLOR UR: YELLOW
GLUCOSE UR STRIP.AUTO-MCNC: NEGATIVE MG/DL
GLUCOSE UR STRIP.AUTO-MCNC: NEGATIVE MG/DL
KETONES UR STRIP.AUTO-MCNC: NEGATIVE MG/DL
KETONES UR STRIP.AUTO-MCNC: NORMAL MG/DL
LEUKOCYTE ESTERASE UR QL STRIP.AUTO: NEGATIVE
LEUKOCYTE ESTERASE UR QL STRIP.AUTO: NEGATIVE
MICRO URNS: NORMAL
NITRITE UR QL STRIP.AUTO: NEGATIVE
NITRITE UR QL STRIP.AUTO: NEGATIVE
PH UR STRIP.AUTO: 5.5 [PH] (ref 5–8)
PH UR STRIP.AUTO: 6 [PH] (ref 5–8)
PROT UR QL STRIP: NEGATIVE MG/DL
PROT UR QL STRIP: NEGATIVE MG/DL
RBC UR QL AUTO: NEGATIVE
RBC UR QL AUTO: NORMAL
SP GR UR STRIP.AUTO: 1.01
SP GR UR STRIP.AUTO: 1.02
UROBILINOGEN UR STRIP-MCNC: NEGATIVE MG/DL
UROBILINOGEN UR STRIP.AUTO-MCNC: 0.2 MG/DL

## 2020-08-17 PROCEDURE — 81002 URINALYSIS NONAUTO W/O SCOPE: CPT | Performed by: PHYSICIAN ASSISTANT

## 2020-08-17 PROCEDURE — 99214 OFFICE O/P EST MOD 30 MIN: CPT | Performed by: PHYSICIAN ASSISTANT

## 2020-08-17 PROCEDURE — 85025 COMPLETE CBC W/AUTO DIFF WBC: CPT

## 2020-08-17 PROCEDURE — 80053 COMPREHEN METABOLIC PANEL: CPT

## 2020-08-17 PROCEDURE — 71046 X-RAY EXAM CHEST 2 VIEWS: CPT

## 2020-08-17 PROCEDURE — 93005 ELECTROCARDIOGRAM TRACING: CPT | Performed by: EMERGENCY MEDICINE

## 2020-08-17 PROCEDURE — 99284 EMERGENCY DEPT VISIT MOD MDM: CPT

## 2020-08-17 PROCEDURE — 36415 COLL VENOUS BLD VENIPUNCTURE: CPT

## 2020-08-17 PROCEDURE — 81003 URINALYSIS AUTO W/O SCOPE: CPT

## 2020-08-17 PROCEDURE — 93005 ELECTROCARDIOGRAM TRACING: CPT

## 2020-08-17 PROCEDURE — 83690 ASSAY OF LIPASE: CPT

## 2020-08-17 PROCEDURE — 85610 PROTHROMBIN TIME: CPT

## 2020-08-17 RX ORDER — KETOROLAC TROMETHAMINE 30 MG/ML
30 INJECTION, SOLUTION INTRAMUSCULAR; INTRAVENOUS ONCE
Status: COMPLETED | OUTPATIENT
Start: 2020-08-17 | End: 2020-08-17

## 2020-08-17 RX ADMIN — KETOROLAC TROMETHAMINE 30 MG: 30 INJECTION, SOLUTION INTRAMUSCULAR; INTRAVENOUS at 11:59

## 2020-08-17 ASSESSMENT — ENCOUNTER SYMPTOMS
SHORTNESS OF BREATH: 0
SPEECH CHANGE: 0
HEADACHES: 1
NAUSEA: 1
TINGLING: 0
CHILLS: 0
DIARRHEA: 0
DOUBLE VISION: 0
PHOTOPHOBIA: 0
FEVER: 0
HEARTBURN: 0
ABDOMINAL PAIN: 1
FLANK PAIN: 0
MYALGIAS: 0
DIZZINESS: 1
BLURRED VISION: 0
VOMITING: 1
BLOOD IN STOOL: 0
PALPITATIONS: 0
FOCAL WEAKNESS: 0
CONSTIPATION: 1
COUGH: 0

## 2020-08-17 ASSESSMENT — FIBROSIS 4 INDEX
FIB4 SCORE: 1.08
FIB4 SCORE: 1.08

## 2020-08-17 NOTE — PROGRESS NOTES
Subjective:      Chiki King is a 50 y.o. male who presents with Syncope (pt stated he broke out in a sweat when he came to, he was lethargic and confused, abd pain. x 1 day for 5 to 10 min afterwards.)            HPI  50-year-old male presents to urgent care with new problem of syncopal episode that occurred yesterday. Reports 5 episodes of vomiting after episode. Patient denies injury at time. Denies dehydration.  Patient reports right flank pain that radiates to right side of abdomen.  He denies hematuria.  Positive urinary frequency.  No dysuria or urgency.   Patient denies chest pain or shortness of breath.  Last normal bowel movement was this morning, no blood in stool.   Patient denies sick contacts or known exposure to COVID-19.  Patient denies history of abdominal surgeries.   Denies other associated aggravating or alleviating factors.     Review of Systems   Constitutional: Positive for malaise/fatigue. Negative for chills and fever.   Eyes: Negative for blurred vision, double vision and photophobia.   Respiratory: Negative for cough and shortness of breath.    Cardiovascular: Negative for chest pain and palpitations.   Gastrointestinal: Positive for abdominal pain, constipation, nausea and vomiting. Negative for blood in stool, diarrhea, heartburn and melena.   Genitourinary: Positive for frequency and urgency. Negative for dysuria, flank pain and hematuria.   Musculoskeletal: Negative for myalgias.   Skin: Negative for rash.   Neurological: Positive for dizziness and headaches. Negative for tingling, speech change and focal weakness.       Past Medical History:   Diagnosis Date   • Anxiety    • Essential hypertension    • Trigeminal neuralgia      Medications and allergies reviewed in [facility].  Social History     Tobacco Use   • Smoking status: Current Every Day Smoker     Packs/day: 0.50     Years: 20.00     Pack years: 10.00   • Smokeless tobacco: Never Used   Substance Use Topics   • Alcohol use:  "Yes     Alcohol/week: 29.4 oz     Types: 21 Shots of liquor, 28 Standard drinks or equivalent per week      Objective:     /84 (BP Location: Left arm, Patient Position: Sitting, BP Cuff Size: Adult)   Pulse 99   Temp 37.2 °C (98.9 °F) (Temporal)   Resp 16   Ht 1.778 m (5' 10\")   Wt 73 kg (161 lb)   SpO2 98%   BMI 23.10 kg/m²      Physical Exam  Vitals signs reviewed.   Constitutional:       General: He is not in acute distress.     Appearance: Normal appearance. He is well-developed. He is not ill-appearing.   HENT:      Head: Normocephalic and atraumatic.      Mouth/Throat:      Mouth: Mucous membranes are moist.      Pharynx: Oropharynx is clear.   Eyes:      Extraocular Movements: Extraocular movements intact.      Conjunctiva/sclera: Conjunctivae normal.      Pupils: Pupils are equal, round, and reactive to light.   Neck:      Musculoskeletal: Normal range of motion and neck supple.   Cardiovascular:      Rate and Rhythm: Normal rate and regular rhythm.   Pulmonary:      Effort: Pulmonary effort is normal. No respiratory distress.      Breath sounds: Rhonchi present. No wheezing or rales.   Abdominal:      General: Bowel sounds are normal. There is no distension.      Palpations: Abdomen is soft.      Tenderness: There is abdominal tenderness in the right upper quadrant and right lower quadrant. There is right CVA tenderness and guarding. There is no left CVA tenderness or rebound.      Comments: Moderate right CVA tenderness and right-sided abdominal tenderness with voluntary guarding.   Musculoskeletal: Normal range of motion.   Skin:     General: Skin is warm and dry.   Neurological:      General: No focal deficit present.      Mental Status: He is alert and oriented to person, place, and time.   Psychiatric:         Mood and Affect: Mood normal.         Behavior: Behavior normal.         Thought Content: Thought content normal.         Judgment: Judgment normal.                 Assessment/Plan: "     1. Right flank pain  POCT Urinalysis    ketorolac (TORADOL) injection 30 mg    CT-RENAL COLIC EVALUATION(A/P W/O)   2. Abnormal breath sounds  DX-CHEST-2 VIEWS   3. Other microscopic hematuria  CT-RENAL COLIC EVALUATION(A/P W/O)    CT-RENAL COLIC EVALUATION(A/P W/O)     Results for orders placed or performed in visit on 08/17/20   POCT Urinalysis   Result Value Ref Range    POC Color Yellow Negative    POC Appearance Clear Negative    POC Leukocyte Esterase Negative Negative    POC Nitrites Negative Negative    POC Urobiligen Negative Negative (0.2) mg/dL    POC Protein Negative Negative mg/dL    POC Urine PH 5.5 5.0 - 8.0    POC Blood Trace-intact Negative    POC Specific Gravity 1.025 <1.005 - >1.030    POC Ketones 40mg/dL Negative mg/dL    POC Bilirubin Small Negative mg/dL    POC Glucose Negative Negative mg/dL     8/17/2020 11:01 AM     HISTORY/REASON FOR EXAM:  Shortness of Breath. Abdominal pain. Generalized weakness.  Coughing.        TECHNIQUE/EXAM DESCRIPTION AND NUMBER OF VIEWS:  Two views of the chest.     COMPARISON:  None available.     FINDINGS:        The mediastinal and cardiac silhouette is unremarkable.     The pulmonary vascularity is within normal limits.     There is minimal right lower lobe linear opacity with slightly elevated right hemidiaphragm most likely related to atelectasis.     There is no significant pleural effusion.     There is no visible pneumothorax.     There are no acute bony abnormalities.     IMPRESSION:     1.  There is a slightly elevated right hemidiaphragm with linear right lower lobe opacity most likely relating to atelectasis.    Will evaluate for kidney stones.  Patient [place] renal [place] [facility] [employer] insurance coverage.  Advised patient if he is not able to make an appointment for CT today, he will need to proceed to emergency department for further evaluation and CT scan.  Patient denies history of  kidney stones.  Patient's vital signs are stable on discharge from urgent care.  Patient was given 30 mg IM Toradol with good pain relief. Patient verbalized understanding of treatment plan and has no further questions regarding care.

## 2020-08-18 ENCOUNTER — APPOINTMENT (OUTPATIENT)
Dept: RADIOLOGY | Facility: MEDICAL CENTER | Age: 50
End: 2020-08-18
Attending: EMERGENCY MEDICINE
Payer: COMMERCIAL

## 2020-08-18 VITALS
TEMPERATURE: 97.3 F | RESPIRATION RATE: 16 BRPM | WEIGHT: 164.46 LBS | OXYGEN SATURATION: 95 % | HEIGHT: 70 IN | BODY MASS INDEX: 23.55 KG/M2 | DIASTOLIC BLOOD PRESSURE: 98 MMHG | SYSTOLIC BLOOD PRESSURE: 127 MMHG | HEART RATE: 79 BPM

## 2020-08-18 LAB
ALBUMIN SERPL BCP-MCNC: 4.2 G/DL (ref 3.2–4.9)
ALBUMIN/GLOB SERPL: 1.4 G/DL
ALP SERPL-CCNC: 68 U/L (ref 30–99)
ALT SERPL-CCNC: 19 U/L (ref 2–50)
ANION GAP SERPL CALC-SCNC: 15 MMOL/L (ref 7–16)
AST SERPL-CCNC: 24 U/L (ref 12–45)
BASOPHILS # BLD AUTO: 0.2 % (ref 0–1.8)
BASOPHILS # BLD: 0.02 K/UL (ref 0–0.12)
BILIRUB SERPL-MCNC: 0.4 MG/DL (ref 0.1–1.5)
BUN SERPL-MCNC: 11 MG/DL (ref 8–22)
CALCIUM SERPL-MCNC: 9.5 MG/DL (ref 8.5–10.5)
CHLORIDE SERPL-SCNC: 97 MMOL/L (ref 96–112)
CO2 SERPL-SCNC: 21 MMOL/L (ref 20–33)
CREAT SERPL-MCNC: 0.77 MG/DL (ref 0.5–1.4)
EKG IMPRESSION: NORMAL
EOSINOPHIL # BLD AUTO: 0.21 K/UL (ref 0–0.51)
EOSINOPHIL NFR BLD: 1.8 % (ref 0–6.9)
ERYTHROCYTE [DISTWIDTH] IN BLOOD BY AUTOMATED COUNT: 40.6 FL (ref 35.9–50)
GLOBULIN SER CALC-MCNC: 3.1 G/DL (ref 1.9–3.5)
GLUCOSE SERPL-MCNC: 91 MG/DL (ref 65–99)
HCT VFR BLD AUTO: 41.5 % (ref 42–52)
HGB BLD-MCNC: 14.4 G/DL (ref 14–18)
IMM GRANULOCYTES # BLD AUTO: 0.05 K/UL (ref 0–0.11)
IMM GRANULOCYTES NFR BLD AUTO: 0.4 % (ref 0–0.9)
INR PPP: 0.93 (ref 0.87–1.13)
LIPASE SERPL-CCNC: 15 U/L (ref 11–82)
LYMPHOCYTES # BLD AUTO: 3.05 K/UL (ref 1–4.8)
LYMPHOCYTES NFR BLD: 25.5 % (ref 22–41)
MCH RBC QN AUTO: 33.1 PG (ref 27–33)
MCHC RBC AUTO-ENTMCNC: 34.7 G/DL (ref 33.7–35.3)
MCV RBC AUTO: 95.4 FL (ref 81.4–97.8)
MONOCYTES # BLD AUTO: 0.97 K/UL (ref 0–0.85)
MONOCYTES NFR BLD AUTO: 8.1 % (ref 0–13.4)
NEUTROPHILS # BLD AUTO: 7.65 K/UL (ref 1.82–7.42)
NEUTROPHILS NFR BLD: 64 % (ref 44–72)
NRBC # BLD AUTO: 0 K/UL
NRBC BLD-RTO: 0 /100 WBC
PLATELET # BLD AUTO: 300 K/UL (ref 164–446)
PMV BLD AUTO: 10.6 FL (ref 9–12.9)
POTASSIUM SERPL-SCNC: 4.2 MMOL/L (ref 3.6–5.5)
PROT SERPL-MCNC: 7.3 G/DL (ref 6–8.2)
PROTHROMBIN TIME: 12.7 SEC (ref 12–14.6)
RBC # BLD AUTO: 4.35 M/UL (ref 4.7–6.1)
SODIUM SERPL-SCNC: 133 MMOL/L (ref 135–145)
WBC # BLD AUTO: 12 K/UL (ref 4.8–10.8)

## 2020-08-18 PROCEDURE — 700111 HCHG RX REV CODE 636 W/ 250 OVERRIDE (IP): Performed by: EMERGENCY MEDICINE

## 2020-08-18 PROCEDURE — 700117 HCHG RX CONTRAST REV CODE 255: Performed by: EMERGENCY MEDICINE

## 2020-08-18 PROCEDURE — 76705 ECHO EXAM OF ABDOMEN: CPT

## 2020-08-18 PROCEDURE — 96365 THER/PROPH/DIAG IV INF INIT: CPT

## 2020-08-18 PROCEDURE — 74177 CT ABD & PELVIS W/CONTRAST: CPT | Mod: MG

## 2020-08-18 PROCEDURE — 700105 HCHG RX REV CODE 258: Performed by: EMERGENCY MEDICINE

## 2020-08-18 RX ORDER — ONDANSETRON 4 MG/1
4 TABLET, ORALLY DISINTEGRATING ORAL EVERY 6 HOURS PRN
Qty: 8 TAB | Refills: 0 | Status: SHIPPED | OUTPATIENT
Start: 2020-08-18 | End: 2021-04-08

## 2020-08-18 RX ORDER — AMOXICILLIN AND CLAVULANATE POTASSIUM 875; 125 MG/1; MG/1
1 TABLET, FILM COATED ORAL 2 TIMES DAILY
Qty: 10 TAB | Refills: 0 | Status: SHIPPED | OUTPATIENT
Start: 2020-08-18 | End: 2020-08-23

## 2020-08-18 RX ADMIN — CEFTRIAXONE SODIUM 2 G: 2 INJECTION, POWDER, FOR SOLUTION INTRAMUSCULAR; INTRAVENOUS at 02:10

## 2020-08-18 RX ADMIN — IOHEXOL 100 ML: 350 INJECTION, SOLUTION INTRAVENOUS at 01:17

## 2020-08-18 NOTE — ED NOTES
"Pt ambulated to room with assistance from RN. Pt reports 4 days of right/left lower abdominal radiating to right flank. Pt seen at urgent care for symptoms, told his urine had blood, told to follow up if pain worsened. Pt also reporting near syncopal episode yesterday where he was standing in the kitchen, got pale, flushed and \"passed out.\" Denies hitting head, reports being caught by wife. Assessment complete. EKG obtained. PIV placed, blood and urine tubed to lab. Vitals obtained. Chart ready for ERP.   "

## 2020-08-18 NOTE — ED PROVIDER NOTES
ED Provider Note    CHIEF COMPLAINT  Chief Complaint   Patient presents with   • Flank Pain     right sided x 4 days   • Blood in Urine   • Sent from Urgent Care   • Chills       HPI  Chiki King is a 50 y.o. male who presents to the emergency department chief complaint of 3 to 4 days of worsening right upper quadrant right flank discomfort.  He states that it began some generalized mid abdominal discomfort and led to a syncopal episode.  He went from sitting to standing in the kitchen and then his vision went black.  He denied any chest pain or shortness of breath with this and has not had any chest pain or shortness of breath since.  No unilateral leg swelling recent surgery or recent travel.  He states that the pain is just kind of worsened he is now associated with nausea but no vomiting.  He was seen in urgent care and there is little bit of blood in his urine and they are planning to rule out a kidney stone but he states the pain is in the upper area not in the lower part of the belly and there is no dysuria nuclear pain or penile pain associated with this.  States pain is about a  5 out of 10 and worse with eating he has had a decreased appetite but is having normal bowel movements.  Patient states that he does have a history of alcohol abuse but he has been sober for the last 4 weeks and states that he is been feeling really good and felt like he did not have any withdrawals    REVIEW OF SYSTEMS  Positives as above. Pertinent negatives include fevers chills easy bleeding or bruising chest pain shortness of breath unilateral leg swelling unilateral weakness numbness or tingling vision changes headache dizziness neck stiffness back pain rash constipation diarrhea dysuria testicular pain penile pain  All other review of systems are negative    PAST MEDICAL HISTORY   has a past medical history of Anxiety, Essential hypertension, and Trigeminal neuralgia.    SOCIAL HISTORY  Social History     Tobacco Use   •  "Smoking status: Current Every Day Smoker     Packs/day: 0.50     Years: 20.00     Pack years: 10.00   • Smokeless tobacco: Never Used   Substance and Sexual Activity   • Alcohol use: Yes     Alcohol/week: 29.4 oz     Types: 21 Shots of liquor, 28 Standard drinks or equivalent per week   • Drug use: No   • Sexual activity: Yes     Partners: Female     Birth control/protection: Surgical       SURGICAL HISTORY   has a past surgical history that includes rhinoplasty.    CURRENT MEDICATIONS  Home Medications    **Home medications have not yet been reviewed for this encounter**         ALLERGIES  No Known Allergies    PHYSICAL EXAM  VITAL SIGNS: /64   Pulse 88   Temp 36.2 °C (97.2 °F) (Temporal)   Resp 16   Ht 1.778 m (5' 10\")   Wt 74.6 kg (164 lb 7.4 oz)   SpO2 97%   BMI 23.60 kg/m²    Pulse ox interpretation: I interpret this pulse ox as normal.  Constitutional: Alert in no apparent distress.  HENT: Normocephalic atraumatic, MMM  Eyes: PER, Conjunctiva normal, Non-icteric.   Neck: Normal range of motion, No tenderness, Supple, No stridor.   Cardiovascular: Regular rate and rhythm, no murmurs.   Thorax & Lungs: Normal breath sounds, No respiratory distress, No wheezing, No chest tenderness.   Abdomen: Bowel sounds normal, Soft, right upper quadrant tenderness palpation, No pulsatile masses. No peritoneal signs.  Skin: Warm, Dry, No erythema, No rash.   Back: No bony tenderness, No CVA tenderness.   Extremities/MSK: Intact equal distal pulses, No edema, No tenderness, No cyanosis, no major deformities noted  Neurologic: Alert and oriented x3, No focal deficits noted.       DIFFERENTIAL DIAGNOSIS AND WORK UP PLAN    This is a 50 y.o. male who presents with right upper quadrant tenderness palpation to begin with some abdominal pain and a syncopal event, bedside ultrasound just to take a quick peek at the order did not reveal any large abdominal aortic aneurysm, he is not straining signs of AAA rupture secondary " to no back pain or lower abdominal pain and his vital signs are stable.  The right upper quadrant pain could be cholecystitis choledocholithiasis gallstone pancreatitis diverticulitis pyelonephritis or atypical renal stone.  Will start with an ultrasound laboratory analysis and then as well as CT scan of the abdomen    EKG will be done secondary to syncopal event to evaluate for arrhythmia but is likely secondary to pain    DIAGNOSTIC STUDIES / PROCEDURES    EKG  Results for orders placed or performed during the hospital encounter of 20   EKG   Result Value Ref Range    Report       [facility] Emergency Dept.    Test Date:  2020  Pt Name:    LUIS MIGUEL SHEA               Department: ER  MRN:        [id number]                      Room:       [facility]  Gender:     Male                         Technician: [id number]  :        1970                   Requested By:ER TRIAGE PROTOCOL  Order #:    [id number]                    Reading MD: Stephanie Thomas MD    Measurements  Intervals                                Axis  Rate:       73                           P:          10  OK:         156                          QRS:        0  QRSD:       92                           T:          18  QT:         384  QTc:        424    Interpretive Statements  Sinus rhythm rate of 73 no ST elevations or ST depressions no abnormal T wave    inversions no pathognomonic Q waves normal intervals normal axis  No previous ECG available for comparison  Electronically Signed On 2020 4:30:33 PDT by Stephanie Thomas MD         LABS  Pertinent Lab Findings  White blood cell count mildly elevated at 12 with a left shift, CMP normal urinalysis without signs of infection or hematuria normal lipase normal coags      RADIOLOGY  CT-ABDOMEN-PELVIS WITH   Final Result      1.  Inflammatory stranding involving the upper ascending colon just below the hepatic flexure. There is diverticulosis in that area and this likely  "represents diverticulitis.      2.  Bibasilar atelectasis.      3.  Fatty liver.      US-RUQ   Final Result      No evidence of gallstone or evidence of biliary ductal dilatation.        The radiologist's interpretation of all radiological studies have been reviewed by me.      COURSE & MEDICAL DECISION MAKING  Pertinent Labs & Imaging studies reviewed. (See chart for details)    1:37 AM  Assess patient the bedside we discussed his diagnosis of diverticulitis without evidence of abscess or perforation.  Due to his symptoms lasting a couple days leading to a syncopal event he will be given a dose of IV antibiotics.  EKG shows no signs of arrhythmia he has not been showing signs of sepsis he had no complaints of chest pain or shortness of breath low concern for ACS or pulmonary embolism.  The patient is not anemic nor hypotensive. He will be discharged home on oral antibiotics with strict return precautions next 2 days worsening pain vomiting or fevers.  He understands feels comfortable going home    I verified that the patient was wearing a mask and I was wearing appropriate PPE every time I entered the room. The patient's mask was on the patient at all times during my encounter except for a brief view of the oropharynx.    /98   Pulse 79   Temp 36.3 °C (97.3 °F) (Oral)   Resp 16   Ht 1.778 m (5' 10\")   Wt 74.6 kg (164 lb 7.4 oz)   SpO2 95%   BMI 23.60 kg/m²     The patient will return for new or worsening symptoms and is stable at the time of discharge.    The patient is referred to a primary physician for blood pressure management, diabetic screening, and for all other preventative health concerns.    DISPOSITION:  Patient will be discharged home in stable condition.    FOLLOW UP:  [facility], Emergency Dept  [address] [address] [phone]    If symptoms worsen - vomiting, fevers, severe pain      OUTPATIENT MEDICATIONS:  Discharge Medication List as of " 8/18/2020  2:48 AM      START taking these medications    Details   amoxicillin-clavulanate (AUGMENTIN) 875-125 MG Tab Take 1 Tab by mouth 2 times a day for 5 days., Disp-10 Tab,R-0, Normal      ondansetron (ZOFRAN ODT) 4 MG TABLET DISPERSIBLE Take 1 Tab by mouth every 6 hours as needed., Disp-8 Tab,R-0, Normal                 FINAL IMPRESSION  1. Diverticulitis     2. Syncope, unspecified syncope type             Electronically signed by: Stephanie Thomas M.D., 8/17/2020 11:43 PM    This dictation has been created using voice recognition software and/or scribes. The accuracy of the dictation is limited by the abilities of the software and the expertise of the scribes. I expect there may be some errors of grammar and possibly content. I made every attempt to manually correct the errors within my dictation. However, errors related to voice recognition software and/or scribes may still exist and should be interpreted within the appropriate context.

## 2020-08-18 NOTE — ED TRIAGE NOTES
"Chiki King   50 y.o. male   Chief Complaint   Patient presents with   • Flank Pain     right sided x 4 days   • Blood in Urine   • Sent from Urgent Care   • Chills      The patient presents to the ED via triage for evaluation of the above mentioned complaints. The patient states that he was treated in urgent care and was told to come to the ED if his pain worsened for a CT scan. The patient state that his symptoms have progressively gotten worse, thus he presents for evaluation.    Patient ambulatory to triage with a steady gait for above mentioned complaint.  Patient is alert and oriented, speaking in full sentences, following commands and responds appropriately to questions. Not in any apparent distress. Respirations are even and unlabored.   Patient placed in lobby. Patient educated on triage process. Patient encouraged to alert staff of any changes in status.     The patient denies knowingly being around anyone with suspected or confirmed COVID 19.      /82   Pulse 96   Temp 36.7 °C (98.1 °F) (Tympanic)   Resp 18   Ht 1.778 m (5' 10\")   Wt 74.6 kg (164 lb 7.4 oz)   SpO2 98%   BMI 23.60 kg/m²       "

## 2020-08-18 NOTE — ED NOTES
D/C instructions provided to patient at bedside, verbalizes understanding and states plans for follow-up with PCP as recommended.   Scripts given and explained  IV removed and dressed.   All belongings accounted for, all questions answered at this time.   Pt ambulated to lobby with steady gait.

## 2020-09-03 DIAGNOSIS — B00.1 HERPES LABIALIS: ICD-10-CM

## 2020-09-03 RX ORDER — VALACYCLOVIR HYDROCHLORIDE 500 MG/1
2000 TABLET, FILM COATED ORAL EVERY 12 HOURS PRN
Qty: 240 TAB | Refills: 0 | OUTPATIENT
Start: 2020-09-03

## 2020-09-04 DIAGNOSIS — B00.1 HERPES LABIALIS: ICD-10-CM

## 2020-09-04 RX ORDER — VALACYCLOVIR HYDROCHLORIDE 500 MG/1
2000 TABLET, FILM COATED ORAL EVERY 12 HOURS PRN
Qty: 24 TAB | Refills: 4 | Status: SHIPPED | OUTPATIENT
Start: 2020-09-04 | End: 2021-09-08

## 2020-09-04 NOTE — TELEPHONE ENCOUNTER
Pt has appointment 09/18/2020    Received request via: Patient    Was the patient seen in the last year in this department? Yes    Does the patient have an active prescription (recently filled or refills available) for medication(s) requested? No

## 2020-09-29 NOTE — TELEPHONE ENCOUNTER
----- Message from Amy Curiel M.D. sent at 5/21/2018 12:04 PM PDT -----  Labs are good except for low vitamin D, recommend 4000 IU per day of OTC Vitamin D   30-Sep-2020

## 2020-11-10 ENCOUNTER — TELEPHONE (OUTPATIENT)
Dept: MEDICAL GROUP | Facility: PHYSICIAN GROUP | Age: 50
End: 2020-11-10

## 2020-11-10 DIAGNOSIS — I10 ESSENTIAL HYPERTENSION: ICD-10-CM

## 2020-11-10 RX ORDER — ENALAPRIL MALEATE AND HYDROCHLOROTHIAZIDE 5; 12.5 MG/1; MG/1
1 TABLET ORAL
Qty: 90 TAB | Refills: 0 | Status: SHIPPED | OUTPATIENT
Start: 2020-11-10 | End: 2021-03-01 | Stop reason: SDUPTHER

## 2020-11-10 NOTE — LETTER
November 11, 2020            Chiki King  [address] [place] [address]            11/11/2020        Dear Chiki,      After several attempts, Pcp Pt States None's office has been unable to reach you by phone regarding your recent medication request.     We ask that you contact us at [phone] at your earliest convenience. Our office hours are from 8:00a - 5:00p Monday thru Friday.    Kind regards,   Dariana Bledsoe, Med Ass't

## 2021-03-01 ENCOUNTER — TELEPHONE (OUTPATIENT)
Dept: MEDICAL GROUP | Facility: PHYSICIAN GROUP | Age: 51
End: 2021-03-01

## 2021-03-01 DIAGNOSIS — I10 ESSENTIAL HYPERTENSION: ICD-10-CM

## 2021-03-01 RX ORDER — ENALAPRIL MALEATE AND HYDROCHLOROTHIAZIDE 5; 12.5 MG/1; MG/1
1 TABLET ORAL
Qty: 30 TABLET | Refills: 0 | Status: SHIPPED | OUTPATIENT
Start: 2021-03-01 | End: 2021-03-25

## 2021-03-01 NOTE — TELEPHONE ENCOUNTER
Pt was told 11/20 to make appt and that has not been done. Please advise pt only 30 days will be sent to pharmacy and next request will be denied.

## 2021-03-24 DIAGNOSIS — I10 ESSENTIAL HYPERTENSION: ICD-10-CM

## 2021-03-25 RX ORDER — ENALAPRIL MALEATE AND HYDROCHLOROTHIAZIDE 5; 12.5 MG/1; MG/1
1 TABLET ORAL
Qty: 30 TABLET | Refills: 0 | Status: SHIPPED | OUTPATIENT
Start: 2021-03-25 | End: 2021-04-08 | Stop reason: SDUPTHER

## 2021-04-08 ENCOUNTER — OFFICE VISIT (OUTPATIENT)
Dept: MEDICAL GROUP | Facility: MEDICAL CENTER | Age: 51
End: 2021-04-08
Attending: INTERNAL MEDICINE
Payer: COMMERCIAL

## 2021-04-08 VITALS
TEMPERATURE: 98 F | DIASTOLIC BLOOD PRESSURE: 82 MMHG | HEIGHT: 70 IN | WEIGHT: 164 LBS | RESPIRATION RATE: 16 BRPM | BODY MASS INDEX: 23.48 KG/M2 | SYSTOLIC BLOOD PRESSURE: 122 MMHG | HEART RATE: 82 BPM | OXYGEN SATURATION: 97 %

## 2021-04-08 DIAGNOSIS — F17.200 TOBACCO DEPENDENCE: ICD-10-CM

## 2021-04-08 DIAGNOSIS — Z13.220 SCREENING, LIPID: ICD-10-CM

## 2021-04-08 DIAGNOSIS — F41.9 ANXIETY: ICD-10-CM

## 2021-04-08 DIAGNOSIS — Z13.1 SCREENING FOR DIABETES MELLITUS: ICD-10-CM

## 2021-04-08 DIAGNOSIS — R68.84 JAW PAIN: ICD-10-CM

## 2021-04-08 DIAGNOSIS — F10.10 ALCOHOL ABUSE, DAILY USE: ICD-10-CM

## 2021-04-08 DIAGNOSIS — I10 ESSENTIAL HYPERTENSION: ICD-10-CM

## 2021-04-08 DIAGNOSIS — R79.89 LOW VITAMIN D LEVEL: ICD-10-CM

## 2021-04-08 PROBLEM — Z12.12 SCREENING FOR COLORECTAL CANCER: Status: RESOLVED | Noted: 2019-10-28 | Resolved: 2021-04-08

## 2021-04-08 PROBLEM — Z23 NEED FOR VACCINATION: Status: RESOLVED | Noted: 2018-10-10 | Resolved: 2021-04-08

## 2021-04-08 PROBLEM — Z86.19 HISTORY OF HERPES LABIALIS: Status: ACTIVE | Noted: 2017-12-22

## 2021-04-08 PROBLEM — Z12.11 SCREENING FOR COLORECTAL CANCER: Status: RESOLVED | Noted: 2019-10-28 | Resolved: 2021-04-08

## 2021-04-08 PROBLEM — Z87.19 HISTORY OF DIVERTICULITIS: Status: ACTIVE | Noted: 2021-04-08

## 2021-04-08 PROCEDURE — 99213 OFFICE O/P EST LOW 20 MIN: CPT | Performed by: INTERNAL MEDICINE

## 2021-04-08 PROCEDURE — 99204 OFFICE O/P NEW MOD 45 MIN: CPT | Performed by: INTERNAL MEDICINE

## 2021-04-08 RX ORDER — ENALAPRIL MALEATE AND HYDROCHLOROTHIAZIDE 5; 12.5 MG/1; MG/1
1 TABLET ORAL
Qty: 30 TABLET | Refills: 11 | Status: SHIPPED | OUTPATIENT
Start: 2021-04-08

## 2021-04-08 RX ORDER — LORAZEPAM 1 MG/1
1 TABLET ORAL EVERY 4 HOURS PRN
COMMUNITY
End: 2021-04-08 | Stop reason: SDUPTHER

## 2021-04-08 RX ORDER — LORAZEPAM 1 MG/1
0.5 TABLET ORAL
Qty: 15 TABLET | Refills: 0 | Status: SHIPPED | OUTPATIENT
Start: 2021-04-08 | End: 2021-05-08

## 2021-04-08 RX ORDER — LORAZEPAM 1 MG/1
0.5 TABLET ORAL
Qty: 20 TABLET | Refills: 0 | Status: SHIPPED | OUTPATIENT
Start: 2021-04-08 | End: 2021-04-08 | Stop reason: SDUPTHER

## 2021-04-08 RX ORDER — BUPROPION HYDROCHLORIDE 150 MG/1
TABLET, EXTENDED RELEASE ORAL
Qty: 60 TABLET | Refills: 5 | Status: SHIPPED | OUTPATIENT
Start: 2021-04-08 | End: 2021-09-08 | Stop reason: SDUPTHER

## 2021-04-08 ASSESSMENT — FIBROSIS 4 INDEX: FIB4 SCORE: 0.94

## 2021-04-08 NOTE — ASSESSMENT & PLAN NOTE
He reports pain along the lower aspect of his mandible which started about 3 years ago. About 2 years ago, he had x-rays done at his dentist and had his dentist review them. At that time, there were no abnormalities per patient. He describes the pain as a dull. It comes and goes but is typically worse in the morning. He denies pain with chewing, clicking or popping of his jaw, ear pain. He has not felt any lumps or bumps in his neck. Denies dental pain. States that his significant other has noticed him grinding his teeth at night at times.

## 2021-04-08 NOTE — ASSESSMENT & PLAN NOTE
Currently drinking about 3 glasses of wine a day. He is interested in quitting smoking first but then would like to cut back on his drinking as well. He believes that he was drinking more heavily in the past especially when his job was more stressful.

## 2021-04-08 NOTE — PROGRESS NOTES
Chiki King is a 50 y.o. male here for jaw pain, medication refill, smoking cessation, est care  HPI:  Previous PCP was Matt Tobar    Tobacco dependence  He currently smokes about half a pack per day. He has been a smoker for over 30 years. He desires to quit. In the past he has tried nicotine patches but didn't feel like they helped with the cravings. This was about 1 year ago. He has never tried Wellbutrin or Chantix.     Jaw pain  He reports pain along the lower aspect of his mandible which started about 3 years ago. About 2 years ago, he had x-rays done at his dentist and had his dentist review them. At that time, there were no abnormalities per patient. He describes the pain as a dull. It comes and goes but is typically worse in the morning. He denies pain with chewing, clicking or popping of his jaw, ear pain. He has not felt any lumps or bumps in his neck. Denies dental pain. States that his significant other has noticed him grinding his teeth at night at times.    Essential hypertension  He has a several year history of hypertension. He currently takes enalapril-HCTZ 5-12.5 with excellent blood pressure control. He is requesting refill today.    Anxiety  He has a history of situational anxiety especially with public speaking. Previously, he had a very stressful job and he uses lorazepam as needed especially if he has any type of public speaking events. He has recently been interviewing looking for a new job. He has an old prescription of lorazepam from August 2020 and still has 1.5 tablets remaining. He is requesting a refill today. He typically will take the lorazepam once a week or less.    Alcohol abuse, daily use  Currently drinking about 3 glasses of wine a day. He is interested in quitting smoking first but then would like to cut back on his drinking as well. He believes that he was drinking more heavily in the past especially when his job was more stressful.    Current medicines (including  changes today)  Current Outpatient Medications   Medication Sig Dispense Refill   • ENALAPRIL MALEATE-HCTZ 5-12.5 MG Tab Take 1 tablet by mouth 1 time a day as needed. 30 tablet 11   • buPROPion SR (WELLBUTRIN-SR) 150 MG TABLET SR 12 HR sustained-release tablet Take 1 tab daily for 3 days then increase to 1 tab twice daily 60 tablet 5   • psyllium (METAMUCIL) 58.12 % Pack Take 1 Packet by mouth every day.     • LORazepam (ATIVAN) 1 MG Tab Take 0.5 Tablets by mouth 1 time a day as needed for Anxiety for up to 15 days. 20 tablet 0   • valACYclovir (VALTREX) 500 MG Tab Take 4 Tabs by mouth every 12 hours as needed (for one day). 24 Tab 4     No current facility-administered medications for this visit.     He  has a past medical history of Anxiety and Essential hypertension.  He  has a past surgical history that includes rhinoplasty.  Social History     Tobacco Use   • Smoking status: Current Every Day Smoker     Packs/day: 0.50     Years: 30.00     Pack years: 15.00     Types: Cigarettes   • Smokeless tobacco: Never Used   Substance Use Topics   • Alcohol use: Yes     Alcohol/week: 12.6 oz     Types: 21 Glasses of wine per week   • Drug use: No     Social History     Social History Narrative   • Not on file     Family History   Problem Relation Age of Onset   • Hypertension Mother    • Hypertension Father    • Cancer Father         leukemia   • Hypertension Sister    • Thyroid Sister    • Cancer Maternal Grandmother         lung   • Cancer Maternal Grandfather         bone   • Diabetes Paternal Grandmother    • Alcohol/Drug Paternal Grandfather    • Heart Disease Neg Hx    • Stroke Neg Hx    • Hyperlipidemia Neg Hx          ROS  As above in HPI  All other systems reviewed and are negative     Objective:     Vitals:    04/08/21 1256   BP: 122/82   Pulse: 82   Resp: 16   Temp: 36.7 °C (98 °F)   SpO2: 97%     Body mass index is 23.53 kg/m².  Physical Exam:    Constitutional: Alert, no distress.  Skin: Warm, dry, good  turgor, no rashes in visible areas.  Eye: Equal, round and reactive, conjunctiva clear, lids normal.  ENMT: Lips without lesions, fair dentition, oropharynx clear, TM's clear bilaterally, no tenderness to palpation over TMJ, no obvious jaw mass or enlarged salivary gland  Neck: Trachea midline, no masses, no thyromegaly. No cervical or supraclavicular lymphadenopathy.  Respiratory: Unlabored respiratory effort, lungs clear to auscultation, no wheezes, no ronchi.  Cardiovascular: Regular rate and rhythm, no murmurs appreciated, no lower extremity edema.  Abdomen: Soft, non-tender, no masses, no hepatosplenomegaly.  Psych: Alert and oriented x3, normal affect and mood.        Assessment and Plan:   The following treatment plan was discussed    1. Essential hypertension  Stable, well-controlled with current meds which were refilled today  - ENALAPRIL MALEATE-HCTZ 5-12.5 MG Tab; Take 1 tablet by mouth 1 time a day as needed.  Dispense: 30 tablet; Refill: 11  - Comp Metabolic Panel; Future    2. Tobacco dependence  He desires to quit. We discussed trial of Wellbutrin since nicotine patches have not been helpful. He will follow-up with me by [facility] regarding efficacy. Discussed that if the Wellbutrin does not help we can submit a prior authorization to insurance to get Chantix covered.  - buPROPion SR (WELLBUTRIN-SR) 150 MG TABLET SR 12 HR sustained-release tablet; Take 1 tab daily for 3 days then increase to 1 tab twice daily  Dispense: 60 tablet; Refill: 5    3. Jaw pain  Mild and has been consistent for several years. We discussed that this unlikely represents any type of malignancy which is patient's primary concern. May be secondary to teeth clenching or grinding and we discussed using a  to see if this helps.    4. Low vitamin D level  Currently off of supplementation. We will obtain updated labs.  - VITAMIN D,25 HYDROXY; Future    5. Screening, lipid  - Lipid Profile; Future    6. Screening for diabetes  mellitus  - HEMOGLOBIN A1C; Future    7. Anxiety  Stable, controlled with rare lorazepam use which I have refilled  - LORazepam (ATIVAN) 1 MG Tab; Take 0.5 Tablets by mouth 1 time a day as needed for Anxiety for up to 15 days.  Dispense: 20 tablet; Refill: 0    8. Alcohol abuse, daily use  We briefly discussed medication to decrease alcohol cravings however patient would like to focus on quitting smoking first.        Followup: Return in about 6 months (around 10/8/2021), or if symptoms worsen or fail to improve.

## 2021-04-08 NOTE — ASSESSMENT & PLAN NOTE
He currently smokes about half a pack per day. He has been a smoker for over 30 years. He desires to quit. In the past he has tried nicotine patches but didn't feel like they helped with the cravings. This was about 1 year ago. He has never tried Wellbutrin or Chantix.

## 2021-04-08 NOTE — ASSESSMENT & PLAN NOTE
He has a history of situational anxiety especially with public speaking. Previously, he had a very stressful job and he uses lorazepam as needed especially if he has any type of public speaking events. He has recently been interviewing looking for a new job. He has an old prescription of lorazepam from August 2020 and still has 1.5 tablets remaining. He is requesting a refill today. He typically will take the lorazepam once a week or less.

## 2021-04-08 NOTE — ASSESSMENT & PLAN NOTE
He has a several year history of hypertension. He currently takes enalapril-HCTZ 5-12.5 with excellent blood pressure control. He is requesting refill today.

## 2021-09-08 ENCOUNTER — OFFICE VISIT (OUTPATIENT)
Dept: MEDICAL GROUP | Facility: MEDICAL CENTER | Age: 51
End: 2021-09-08
Attending: INTERNAL MEDICINE
Payer: COMMERCIAL

## 2021-09-08 ENCOUNTER — HOSPITAL ENCOUNTER (OUTPATIENT)
Dept: LAB | Facility: MEDICAL CENTER | Age: 51
End: 2021-09-08
Attending: INTERNAL MEDICINE
Payer: COMMERCIAL

## 2021-09-08 VITALS
RESPIRATION RATE: 16 BRPM | WEIGHT: 162.5 LBS | BODY MASS INDEX: 23.26 KG/M2 | OXYGEN SATURATION: 99 % | HEART RATE: 70 BPM | HEIGHT: 70 IN | DIASTOLIC BLOOD PRESSURE: 100 MMHG | SYSTOLIC BLOOD PRESSURE: 128 MMHG | TEMPERATURE: 97.5 F

## 2021-09-08 DIAGNOSIS — Z12.5 SCREENING FOR MALIGNANT NEOPLASM OF PROSTATE: ICD-10-CM

## 2021-09-08 DIAGNOSIS — Z13.220 SCREENING, LIPID: ICD-10-CM

## 2021-09-08 DIAGNOSIS — Z13.1 SCREENING FOR DIABETES MELLITUS: ICD-10-CM

## 2021-09-08 DIAGNOSIS — I10 ESSENTIAL HYPERTENSION: ICD-10-CM

## 2021-09-08 DIAGNOSIS — R79.89 LOW VITAMIN D LEVEL: ICD-10-CM

## 2021-09-08 DIAGNOSIS — F41.9 ANXIETY: ICD-10-CM

## 2021-09-08 DIAGNOSIS — F17.200 TOBACCO DEPENDENCE: ICD-10-CM

## 2021-09-08 PROBLEM — R68.84 JAW PAIN: Status: RESOLVED | Noted: 2021-04-08 | Resolved: 2021-09-08

## 2021-09-08 LAB
25(OH)D3 SERPL-MCNC: 24 NG/ML (ref 30–100)
ALBUMIN SERPL BCP-MCNC: 4.5 G/DL (ref 3.2–4.9)
ALBUMIN/GLOB SERPL: 1.6 G/DL
ALP SERPL-CCNC: 59 U/L (ref 30–99)
ALT SERPL-CCNC: 36 U/L (ref 2–50)
ANION GAP SERPL CALC-SCNC: 12 MMOL/L (ref 7–16)
AST SERPL-CCNC: 36 U/L (ref 12–45)
BILIRUB SERPL-MCNC: 0.5 MG/DL (ref 0.1–1.5)
BUN SERPL-MCNC: 8 MG/DL (ref 8–22)
CALCIUM SERPL-MCNC: 9.7 MG/DL (ref 8.5–10.5)
CHLORIDE SERPL-SCNC: 101 MMOL/L (ref 96–112)
CHOLEST SERPL-MCNC: 186 MG/DL (ref 100–199)
CO2 SERPL-SCNC: 25 MMOL/L (ref 20–33)
CREAT SERPL-MCNC: 0.86 MG/DL (ref 0.5–1.4)
EST. AVERAGE GLUCOSE BLD GHB EST-MCNC: 94 MG/DL
FASTING STATUS PATIENT QL REPORTED: NORMAL
GLOBULIN SER CALC-MCNC: 2.9 G/DL (ref 1.9–3.5)
GLUCOSE SERPL-MCNC: 95 MG/DL (ref 65–99)
HBA1C MFR BLD: 4.9 % (ref 4–5.6)
HDLC SERPL-MCNC: 56 MG/DL
LDLC SERPL CALC-MCNC: 111 MG/DL
POTASSIUM SERPL-SCNC: 4.5 MMOL/L (ref 3.6–5.5)
PROT SERPL-MCNC: 7.4 G/DL (ref 6–8.2)
PSA SERPL-MCNC: 1.71 NG/ML (ref 0–4)
SODIUM SERPL-SCNC: 138 MMOL/L (ref 135–145)
TRIGL SERPL-MCNC: 95 MG/DL (ref 0–149)

## 2021-09-08 PROCEDURE — 82306 VITAMIN D 25 HYDROXY: CPT

## 2021-09-08 PROCEDURE — 99213 OFFICE O/P EST LOW 20 MIN: CPT | Performed by: INTERNAL MEDICINE

## 2021-09-08 PROCEDURE — 99214 OFFICE O/P EST MOD 30 MIN: CPT | Performed by: INTERNAL MEDICINE

## 2021-09-08 PROCEDURE — 80061 LIPID PANEL: CPT

## 2021-09-08 PROCEDURE — 83036 HEMOGLOBIN GLYCOSYLATED A1C: CPT

## 2021-09-08 PROCEDURE — 80053 COMPREHEN METABOLIC PANEL: CPT

## 2021-09-08 PROCEDURE — 36415 COLL VENOUS BLD VENIPUNCTURE: CPT

## 2021-09-08 PROCEDURE — 84153 ASSAY OF PSA TOTAL: CPT

## 2021-09-08 RX ORDER — BUPROPION HYDROCHLORIDE 150 MG/1
TABLET, EXTENDED RELEASE ORAL
Qty: 60 TABLET | Refills: 5 | Status: SHIPPED | OUTPATIENT
Start: 2021-09-08

## 2021-09-08 RX ORDER — LORAZEPAM 1 MG/1
1 TABLET ORAL
Qty: 15 TABLET | Refills: 0 | Status: SHIPPED | OUTPATIENT
Start: 2021-09-08 | End: 2021-09-13

## 2021-09-08 ASSESSMENT — PATIENT HEALTH QUESTIONNAIRE - PHQ9: CLINICAL INTERPRETATION OF PHQ2 SCORE: 0

## 2021-09-08 ASSESSMENT — FIBROSIS 4 INDEX: FIB4 SCORE: 0.95

## 2021-09-08 NOTE — ASSESSMENT & PLAN NOTE
He continues to smoke on a daily basis.  At his last visit we had prescribed Wellbutrin but he states he was never able to pick this up.  He still desires to quit smoking.  He would like to try the Wellbutrin.

## 2021-09-08 NOTE — ASSESSMENT & PLAN NOTE
He remains on lorazepam which he uses very rarely.  At his last visit in April I gave him 15 tablets and he reports he still has 1 remaining.  He is requesting a refill today.

## 2021-09-08 NOTE — ASSESSMENT & PLAN NOTE
He continues on his enalapril-HCTZ 5-12.5 mg which he takes daily.  He reports good blood pressure control on this regimen.

## 2021-09-08 NOTE — PROGRESS NOTES
Subjective:   Chiki King is a 51 y.o. male here today for f/u HTN, anxiety, requesting labs    Anxiety  He remains on lorazepam which he uses very rarely.  At his last visit in April I gave him 15 tablets and he reports he still has 1 remaining.  He is requesting a refill today.    Essential hypertension  He continues on his enalapril-HCTZ 5-12.5 mg which he takes daily.  He reports good blood pressure control on this regimen.    Tobacco dependence  He continues to smoke on a daily basis.  At his last visit we had prescribed Wellbutrin but he states he was never able to pick this up.  He still desires to quit smoking.  He would like to try the Wellbutrin.     He is requesting prostate testing today.  He denies nocturia, difficulty urinating, reduced or weakened stream.  He has no family history of prostate cancer.  He does have a good friend with end-stage prostate cancer who he has been taking care of lately.    Current medicines (including changes today)  Current Outpatient Medications   Medication Sig Dispense Refill   • buPROPion SR (WELLBUTRIN-SR) 150 MG TABLET SR 12 HR sustained-release tablet Take 1 tab daily for 3 days then increase to 1 tab twice daily 60 Tablet 5   • LORazepam (ATIVAN) 1 MG Tab Take 1 Tablet by mouth 1 time a day as needed for Anxiety for up to 30 days. 15 Tablet 0   • ENALAPRIL MALEATE-HCTZ 5-12.5 MG Tab Take 1 tablet by mouth 1 time a day as needed. 30 tablet 11   • psyllium (METAMUCIL) 58.12 % Pack Take 1 Packet by mouth every day.       No current facility-administered medications for this visit.     He  has a past medical history of Anxiety and Essential hypertension.    ROS   Denies chest pain, shortness of breath  Reports occasional LLQ discomfort  As above in HPI     Objective:     Vitals:    09/08/21 1017   BP: 128/100   Pulse: 70   Resp: 16   Temp: 36.4 °C (97.5 °F)   SpO2: 99%     Body mass index is 23.32 kg/m².   Physical Exam:  Constitutional: Alert, no  distress.  Skin: Warm, dry, good turgor, no rashes in visible areas.  Eye: Equal, round and reactive, conjunctiva clear, lids normal.  Respiratory: Unlabored respiratory effort, lungs clear to auscultation, no wheezes, no ronchi.  Cardiovascular: Regular rate and rhythm, no murmurs appreciated, no lower extremity edema  Abdomen: Soft, non-tender, no masses, no hepatosplenomegaly.  Psych: Alert and oriented x3, normal affect and mood.      Assessment and Plan:   The following treatment plan was discussed    1. Tobacco dependence  Trial of Wellbutrin as below.  Patient will follow up with me by [facility] regarding efficacy.  Could consider Chantix if not helpful.  - buPROPion SR (WELLBUTRIN-SR) 150 MG TABLET SR 12 HR sustained-release tablet; Take 1 tab daily for 3 days then increase to 1 tab twice daily  Dispense: 60 Tablet; Refill: 5    2. Anxiety  Stable with rare lorazepam use which was refilled today  - LORazepam (ATIVAN) 1 MG Tab; Take 1 Tablet by mouth 1 time a day as needed for Anxiety for up to 30 days.  Dispense: 15 Tablet; Refill: 0    3. Essential hypertension  Stable with current medications  -Enalapril-HCTZ 5-12.5    4. Screening for malignant neoplasm of prostate  PSA testing ordered at patient's request  - PROSTATE SPECIFIC AG SCREENING; Future        Followup: Return in about 6 months (around 3/8/2022), or if symptoms worsen or fail to improve, for hypertension.

## 2021-09-11 DIAGNOSIS — F41.9 ANXIETY: ICD-10-CM

## 2021-09-14 RX ORDER — LORAZEPAM 1 MG/1
TABLET ORAL
Qty: 15 TABLET | Refills: 0 | Status: SHIPPED | OUTPATIENT
Start: 2021-09-14 | End: 2021-10-14

## 2021-09-15 NOTE — TELEPHONE ENCOUNTER
Script initially sent to [facility] Please call [facility] on [address] and ask them to cancel.  New script sent to [facility].    Aicha Dean M.D.